# Patient Record
Sex: FEMALE | Race: WHITE | Employment: FULL TIME | URBAN - METROPOLITAN AREA
[De-identification: names, ages, dates, MRNs, and addresses within clinical notes are randomized per-mention and may not be internally consistent; named-entity substitution may affect disease eponyms.]

---

## 2017-01-05 LAB — TSH SERPL DL<=0.05 MIU/L-ACNC: 0.86 UIU/ML (ref 0.27–4.2)

## 2017-05-11 ENCOUNTER — TELEPHONE (OUTPATIENT)
Dept: FAMILY MEDICINE CLINIC | Age: 27
End: 2017-05-11

## 2017-05-11 ENCOUNTER — EMPLOYEE WELLNESS (OUTPATIENT)
Dept: OTHER | Age: 27
End: 2017-05-11

## 2017-05-11 DIAGNOSIS — K21.9 GASTROESOPHAGEAL REFLUX DISEASE, ESOPHAGITIS PRESENCE NOT SPECIFIED: Primary | ICD-10-CM

## 2017-05-11 LAB
CHOLESTEROL, TOTAL: 163 MG/DL (ref 0–199)
GLUCOSE BLD-MCNC: 72 MG/DL (ref 70–99)
HDLC SERPL-MCNC: 61 MG/DL (ref 40–60)
LDL CHOLESTEROL CALCULATED: 79 MG/DL
TRIGL SERPL-MCNC: 114 MG/DL (ref 0–150)

## 2017-05-12 RX ORDER — OMEPRAZOLE 20 MG/1
20 CAPSULE, DELAYED RELEASE ORAL DAILY
Qty: 30 CAPSULE | Refills: 0 | Status: SHIPPED | OUTPATIENT
Start: 2017-05-12 | End: 2017-11-27 | Stop reason: SDUPTHER

## 2017-11-27 ENCOUNTER — OFFICE VISIT (OUTPATIENT)
Dept: INTERNAL MEDICINE CLINIC | Age: 27
End: 2017-11-27

## 2017-11-27 VITALS
HEIGHT: 62 IN | HEART RATE: 90 BPM | DIASTOLIC BLOOD PRESSURE: 68 MMHG | WEIGHT: 211.8 LBS | SYSTOLIC BLOOD PRESSURE: 112 MMHG | OXYGEN SATURATION: 98 % | BODY MASS INDEX: 38.98 KG/M2 | TEMPERATURE: 97.9 F

## 2017-11-27 DIAGNOSIS — Z00.00 PHYSICAL EXAM, ANNUAL: Primary | ICD-10-CM

## 2017-11-27 DIAGNOSIS — E66.09 CLASS 2 OBESITY DUE TO EXCESS CALORIES WITHOUT SERIOUS COMORBIDITY WITH BODY MASS INDEX (BMI) OF 38.0 TO 38.9 IN ADULT: ICD-10-CM

## 2017-11-27 DIAGNOSIS — K21.9 GASTROESOPHAGEAL REFLUX DISEASE, ESOPHAGITIS PRESENCE NOT SPECIFIED: ICD-10-CM

## 2017-11-27 PROCEDURE — 99395 PREV VISIT EST AGE 18-39: CPT | Performed by: INTERNAL MEDICINE

## 2017-11-27 RX ORDER — OMEPRAZOLE 20 MG/1
20 CAPSULE, DELAYED RELEASE ORAL DAILY
Qty: 30 CAPSULE | Refills: 0 | Status: SHIPPED | OUTPATIENT
Start: 2017-11-27 | End: 2017-11-28 | Stop reason: SDUPTHER

## 2017-11-27 ASSESSMENT — PATIENT HEALTH QUESTIONNAIRE - PHQ9
1. LITTLE INTEREST OR PLEASURE IN DOING THINGS: 0
SUM OF ALL RESPONSES TO PHQ QUESTIONS 1-9: 0
2. FEELING DOWN, DEPRESSED OR HOPELESS: 0
SUM OF ALL RESPONSES TO PHQ9 QUESTIONS 1 & 2: 0

## 2017-11-27 NOTE — PATIENT INSTRUCTIONS
Please call your pharmacy if you need any refills of your medication(s). Please call our office at [phone] if you don't hear from us about your test results. Bring an accurate list of your medications with you at every appointment to ensure that we have the correct information.     Our office hours are: Monday - Friday 8:30 am- 5 pm    Phone lines turn on at 8:30 am

## 2017-11-27 NOTE — PROGRESS NOTES
SUBJECTIVE:  Natalie Hill is a 23 y.o. female being evaluated for:    Chief Complaint   Patient presents with   Evaristo Andres Doctor     no concerns       HPI   Comes in to  Establish and has no complaints or concerns     No Known Allergies  Current Outpatient Prescriptions   Medication Sig Dispense Refill    omeprazole (PRILOSEC) 20 MG delayed release capsule Take 1 capsule by mouth Daily 30 capsule 0     No current facility-administered medications for this visit. Social History     Social History    Marital status: Single     Spouse name: N/A    Number of children: N/A    Years of education: N/A     Occupational History    Not on file. Social History Main Topics    Smoking status: Never Smoker    Smokeless tobacco: Never Used    Alcohol use 0.0 oz/week      Comment: social use    Drug use: No    Sexual activity: Not on file     Other Topics Concern    Not on file     Social History Narrative    No narrative on file      Past Medical History:   Diagnosis Date    GERD (gastroesophageal reflux disease)      Past Surgical History:   Procedure Laterality Date    ANKLE SURGERY Left     WISDOM TOOTH EXTRACTION         Review of Systems   Constitutional: Negative for activity change, fatigue and unexpected weight change. HENT: Negative for congestion. Eyes: Negative for visual disturbance. Respiratory: Negative for cough and shortness of breath. Cardiovascular: Negative for chest pain, palpitations and leg swelling. Gastrointestinal: Negative for abdominal pain, constipation, diarrhea, nausea and vomiting. Reflux controlled with prilosec    Genitourinary: Negative for dysuria and frequency. Musculoskeletal: Positive for arthralgias (left ankle pain from remote soccer injury ). Skin:        NO Skin lesions that she is concerned about   Neurological: Negative for dizziness, light-headedness and headaches. Psychiatric/Behavioral: Negative for dysphoric mood. OBJECTIVE:  /68 (Site: Left Arm, Position: Sitting, Cuff Size: Large Adult)   Pulse 90   Temp 97.9 °F (36.6 °C) (Oral)   Ht 5' 2\" (1.575 m)   Wt 211 lb 12.8 oz (96.1 kg)   LMP 11/22/2017   SpO2 98%   BMI 38.74 kg/m²      Body mass index is 38.74 kg/m². Physical Exam   Constitutional: She is oriented to person, place, and time. She appears well-developed. No distress. Overweight    HENT:   Head: Normocephalic and atraumatic. Right Ear: External ear normal.   Left Ear: External ear normal.   Eyes: Conjunctivae are normal.   Neck: Neck supple. No thyromegaly present. Cardiovascular: Normal rate, regular rhythm, normal heart sounds and intact distal pulses. Exam reveals no gallop and no friction rub. No murmur heard. Pulmonary/Chest: Effort normal and breath sounds normal. She exhibits no tenderness. Abdominal: Soft. She exhibits no distension. There is no tenderness. No HSM   Musculoskeletal: Normal range of motion. She exhibits no edema or tenderness (No significant ankle tenderness and normal range of motion). Lymphadenopathy:     She has no cervical adenopathy. Neurological: She is alert and oriented to person, place, and time. Coordination normal.   Skin: Skin is warm and dry. No rash noted. Psychiatric: She has a normal mood and affect. Her behavior is normal.   Nursing note and vitals reviewed. ASSESSMENT/PLAN:    [facility] was seen today for established new doctor. Diagnoses and all orders for this visit:    Physical exam, annual    Gastroesophageal reflux disease, esophagitis presence not specified  -     omeprazole (PRILOSEC) 20 MG delayed release capsule;  Take 1 capsule by mouth Daily    Class 2 obesity due to excess calories without serious comorbidity with body mass index (BMI) of 38.0 to 38.9 in adult given information on dieting and low-carb diets        Orders Placed This Encounter   Medications    omeprazole (PRILOSEC) 20 MG delayed release capsule Sig: Take 1 capsule by mouth Daily     Dispense:  30 capsule     Refill:  0        Return in about 1 year (around 11/27/2018), or if symptoms worsen or fail to improve. Patient Instructions   Please call your pharmacy if you need any refills of your medication(s). Please call our office at [phone] if you don't hear from us about your test results. Bring an accurate list of your medications with you at every appointment to ensure that we have the correct information.     Our office hours are: Monday - Friday 8:30 am- 5 pm    Phone lines turn on at 8:30 am

## 2017-11-28 ENCOUNTER — TELEPHONE (OUTPATIENT)
Dept: INTERNAL MEDICINE CLINIC | Age: 27
End: 2017-11-28

## 2017-11-28 DIAGNOSIS — K21.9 GASTROESOPHAGEAL REFLUX DISEASE, ESOPHAGITIS PRESENCE NOT SPECIFIED: ICD-10-CM

## 2017-11-28 PROBLEM — E66.9 CLASS 2 OBESITY IN ADULT: Status: ACTIVE | Noted: 2017-11-28

## 2017-11-28 RX ORDER — OMEPRAZOLE 20 MG/1
20 CAPSULE, DELAYED RELEASE ORAL DAILY
Qty: 90 CAPSULE | Refills: 1 | Status: SHIPPED | OUTPATIENT
Start: 2017-11-28 | End: 2019-02-21 | Stop reason: ALTCHOICE

## 2017-11-28 ASSESSMENT — ENCOUNTER SYMPTOMS
SHORTNESS OF BREATH: 0
COUGH: 0
VOMITING: 0
ROS SKIN COMMENTS: NO SKIN LESIONS THAT SHE IS CONCERNED ABOUT
NAUSEA: 0
DIARRHEA: 0
ABDOMINAL PAIN: 0
CONSTIPATION: 0

## 2017-12-12 ENCOUNTER — OFFICE VISIT (OUTPATIENT)
Dept: INTERNAL MEDICINE CLINIC | Age: 27
End: 2017-12-12

## 2017-12-12 VITALS
BODY MASS INDEX: 39.38 KG/M2 | SYSTOLIC BLOOD PRESSURE: 110 MMHG | TEMPERATURE: 98.1 F | OXYGEN SATURATION: 98 % | DIASTOLIC BLOOD PRESSURE: 70 MMHG | HEIGHT: 62 IN | WEIGHT: 214 LBS | HEART RATE: 80 BPM

## 2017-12-12 DIAGNOSIS — J01.10 ACUTE NON-RECURRENT FRONTAL SINUSITIS: Primary | ICD-10-CM

## 2017-12-12 PROCEDURE — 99213 OFFICE O/P EST LOW 20 MIN: CPT | Performed by: INTERNAL MEDICINE

## 2017-12-12 RX ORDER — AMOXICILLIN 875 MG/1
875 TABLET, COATED ORAL 2 TIMES DAILY
Qty: 20 TABLET | Refills: 0 | Status: SHIPPED | OUTPATIENT
Start: 2017-12-12 | End: 2018-04-02

## 2017-12-12 NOTE — PROGRESS NOTES
SUBJECTIVE:  Jose Daniel Carl is a 33 y.o. female being evaluated for:    Chief Complaint   Patient presents with    Sinusitis     swollen facial pain, sore throat and cough getting worse past 2 days    Congestion       HPI   Sick on and off for a week and worse over the last 2 days. Head congestion and post nasal drip  Nasal drainage is clear  Coughing up clear. NO wheezing No SOB. No chest  Feverish but had no thermometer. Headache frontally. Very sore throat. Ear pressure bilaterally. Everyone is sick around her. No Known Allergies  Current Outpatient Prescriptions   Medication Sig Dispense Refill    amoxicillin (AMOXIL) 875 MG tablet Take 1 tablet by mouth 2 times daily 20 tablet 0    omeprazole (PRILOSEC) 20 MG delayed release capsule Take 1 capsule by mouth Daily 90 capsule 1     No current facility-administered medications for this visit. Social History     Social History    Marital status: Single     Spouse name: N/A    Number of children: N/A    Years of education: N/A     Occupational History    Not on file. Social History Main Topics    Smoking status: Never Smoker    Smokeless tobacco: Never Used    Alcohol use 0.0 oz/week      Comment: social use    Drug use: No    Sexual activity: Not on file     Other Topics Concern    Not on file     Social History Narrative    No narrative on file      Past Medical History:   Diagnosis Date    GERD (gastroesophageal reflux disease)      Past Surgical History:   Procedure Laterality Date    ANKLE SURGERY Left     WISDOM TOOTH EXTRACTION         Review of Systems   Constitutional: Positive for fever. Negative for chills. HENT: Positive for congestion, ear pain, postnasal drip, rhinorrhea and sore throat. Respiratory: Positive for cough. Negative for shortness of breath and wheezing. Gastrointestinal: Negative for abdominal pain, diarrhea, nausea and vomiting.    Genitourinary:        Not pregnant    Musculoskeletal: Negative for myalgias. Neurological: Positive for headaches (frontal ). Negative for dizziness and light-headedness. OBJECTIVE:  /70 (Site: Right Arm, Position: Sitting, Cuff Size: Large Adult)   Pulse 80   Temp 98.1 °F (36.7 °C) (Oral)   Ht 5' 2\" (1.575 m)   Wt 214 lb (97.1 kg)   LMP 11/22/2017   SpO2 98%   BMI 39.14 kg/m²      Body mass index is 39.14 kg/m². Physical Exam   Constitutional: She is oriented to person, place, and time. She appears well-developed. No distress. Obese   HENT:   Right Ear: External ear normal.   Left Ear: External ear normal.   Mouth/Throat: Oropharynx is clear and moist. No oropharyngeal exudate. Frontal tenderness   Eyes: Conjunctivae are normal.   Cardiovascular: Normal rate and regular rhythm. Exam reveals friction rub. Exam reveals no gallop. No murmur heard. Pulmonary/Chest: Effort normal and breath sounds normal.   Abdominal: Soft. She exhibits no distension. There is no tenderness. No hepatosplenomegaly   Musculoskeletal: She exhibits no edema. Lymphadenopathy:     She has cervical adenopathy (Anterior cervical nodes small). Neurological: She is alert and oriented to person, place, and time. Coordination normal.   Skin: Skin is warm and dry. No rash noted. ASSESSMENT/PLAN:    [facility]. Diagnoses and all orders for this visit:    Acute non-recurrent frontal sinusitis  -     amoxicillin (AMOXIL) 875 MG tablet; Take 1 tablet by mouth 2 times daily        Orders Placed This Encounter   Medications    amoxicillin (AMOXIL) 875 MG tablet     Sig: Take 1 tablet by mouth 2 times daily     Dispense:  20 tablet     Refill:  0        Return if symptoms worsen or fail to improve. Patient Instructions   Please call your pharmacy if you need any refills of your medication(s). Please call our office at [phone] if you don't hear from us about your test results.

## 2017-12-12 NOTE — PATIENT INSTRUCTIONS
Please call your pharmacy if you need any refills of your medication(s). Please call our office at [phone] if you don't hear from us about your test results. Bring an accurate list of your medications with you at every appointment to ensure that we have the correct information.     Our office hours are: Monday - Friday 8:30 am- 5 pm    Phone lines turn on at 8:30 am

## 2017-12-13 ASSESSMENT — ENCOUNTER SYMPTOMS
RHINORRHEA: 1
SHORTNESS OF BREATH: 0
DIARRHEA: 0
NAUSEA: 0
WHEEZING: 0
VOMITING: 0
COUGH: 1
SORE THROAT: 1
ABDOMINAL PAIN: 0

## 2018-03-06 ENCOUNTER — EMPLOYEE WELLNESS (OUTPATIENT)
Dept: OTHER | Age: 28
End: 2018-03-06

## 2018-03-06 LAB
CHOLESTEROL, TOTAL: 161 MG/DL (ref 0–199)
GLUCOSE BLD-MCNC: 84 MG/DL (ref 70–99)
HDLC SERPL-MCNC: 60 MG/DL (ref 40–60)
LDL CHOLESTEROL CALCULATED: 78 MG/DL
TRIGL SERPL-MCNC: 114 MG/DL (ref 0–150)

## 2018-03-20 VITALS — WEIGHT: 212 LBS | BODY MASS INDEX: 38.78 KG/M2

## 2018-04-02 ENCOUNTER — HOSPITAL ENCOUNTER (OUTPATIENT)
Dept: NON INVASIVE DIAGNOSTICS | Age: 28
Discharge: HOME | End: 2018-04-02
Attending: INTERNAL MEDICINE | Admitting: INTERNAL MEDICINE

## 2018-04-02 ENCOUNTER — OFFICE VISIT (OUTPATIENT)
Dept: INTERNAL MEDICINE CLINIC | Age: 28
End: 2018-04-02

## 2018-04-02 VITALS
DIASTOLIC BLOOD PRESSURE: 70 MMHG | HEIGHT: 62 IN | HEART RATE: 79 BPM | BODY MASS INDEX: 37.58 KG/M2 | OXYGEN SATURATION: 98 % | WEIGHT: 204.2 LBS | SYSTOLIC BLOOD PRESSURE: 100 MMHG

## 2018-04-02 VITALS — WEIGHT: 204 LBS | BODY MASS INDEX: 37.31 KG/M2

## 2018-04-02 DIAGNOSIS — S99.922A INJURY OF LEFT FOOT, INITIAL ENCOUNTER: ICD-10-CM

## 2018-04-02 DIAGNOSIS — K21.9 GASTROESOPHAGEAL REFLUX DISEASE, ESOPHAGITIS PRESENCE NOT SPECIFIED: ICD-10-CM

## 2018-04-02 DIAGNOSIS — S99.922A INJURY OF LEFT FOOT, INITIAL ENCOUNTER: Primary | ICD-10-CM

## 2018-04-02 PROCEDURE — 99212 OFFICE O/P EST SF 10 MIN: CPT | Performed by: INTERNAL MEDICINE

## 2018-04-02 RX ORDER — IBUPROFEN 800 MG/1
800 TABLET ORAL EVERY 8 HOURS PRN
Qty: 30 TABLET | Refills: 1 | Status: SHIPPED | OUTPATIENT
Start: 2018-04-02 | End: 2018-04-02 | Stop reason: CLARIF

## 2018-04-02 RX ORDER — IBUPROFEN 800 MG/1
800 TABLET ORAL EVERY 8 HOURS PRN
Qty: 30 TABLET | Refills: 1 | Status: SHIPPED
Start: 2018-04-02 | End: 2020-02-10 | Stop reason: ALTCHOICE

## 2018-04-07 ASSESSMENT — ENCOUNTER SYMPTOMS
VOMITING: 0
ABDOMINAL PAIN: 0
DIARRHEA: 0
NAUSEA: 0
BLOOD IN STOOL: 0
COLOR CHANGE: 1

## 2018-08-06 ENCOUNTER — OFFICE VISIT (OUTPATIENT)
Dept: INTERNAL MEDICINE CLINIC | Age: 28
End: 2018-08-06

## 2018-08-06 VITALS
DIASTOLIC BLOOD PRESSURE: 72 MMHG | HEART RATE: 84 BPM | SYSTOLIC BLOOD PRESSURE: 118 MMHG | OXYGEN SATURATION: 98 % | WEIGHT: 203 LBS | BODY MASS INDEX: 37.13 KG/M2 | TEMPERATURE: 98.5 F

## 2018-08-06 DIAGNOSIS — J06.9 ACUTE URI: Primary | ICD-10-CM

## 2018-08-06 PROCEDURE — 99213 OFFICE O/P EST LOW 20 MIN: CPT | Performed by: INTERNAL MEDICINE

## 2018-08-06 RX ORDER — AZITHROMYCIN 250 MG/1
TABLET, FILM COATED ORAL
Qty: 6 TABLET | Refills: 0 | Status: SHIPPED | OUTPATIENT
Start: 2018-08-06 | End: 2018-08-16

## 2018-08-06 RX ORDER — GUAIFENESIN AND CODEINE PHOSPHATE 100; 10 MG/5ML; MG/5ML
10 SOLUTION ORAL 3 TIMES DAILY PRN
Qty: 120 ML | Refills: 0 | Status: SHIPPED | OUTPATIENT
Start: 2018-08-06 | End: 2018-08-13

## 2018-08-06 ASSESSMENT — ENCOUNTER SYMPTOMS
GASTROINTESTINAL NEGATIVE: 1
SINUS PRESSURE: 1
COUGH: 1
SORE THROAT: 1

## 2019-01-24 ENCOUNTER — OFFICE VISIT (OUTPATIENT)
Dept: INTERNAL MEDICINE CLINIC | Age: 29
End: 2019-01-24
Payer: COMMERCIAL

## 2019-01-24 VITALS
DIASTOLIC BLOOD PRESSURE: 58 MMHG | RESPIRATION RATE: 16 BRPM | BODY MASS INDEX: 39.21 KG/M2 | WEIGHT: 214.4 LBS | TEMPERATURE: 98.3 F | HEART RATE: 78 BPM | SYSTOLIC BLOOD PRESSURE: 102 MMHG | OXYGEN SATURATION: 99 %

## 2019-01-24 DIAGNOSIS — E66.09 CLASS 2 OBESITY DUE TO EXCESS CALORIES WITHOUT SERIOUS COMORBIDITY WITH BODY MASS INDEX (BMI) OF 39.0 TO 39.9 IN ADULT: Primary | ICD-10-CM

## 2019-01-24 DIAGNOSIS — K21.9 GASTROESOPHAGEAL REFLUX DISEASE, ESOPHAGITIS PRESENCE NOT SPECIFIED: ICD-10-CM

## 2019-01-24 PROCEDURE — 99213 OFFICE O/P EST LOW 20 MIN: CPT | Performed by: INTERNAL MEDICINE

## 2019-01-24 RX ORDER — PHENTERMINE HYDROCHLORIDE 37.5 MG/1
37.5 TABLET ORAL
Qty: 30 TABLET | Refills: 0 | Status: SHIPPED | OUTPATIENT
Start: 2019-01-24 | End: 2019-02-21 | Stop reason: SDUPTHER

## 2019-01-24 RX ORDER — RANITIDINE 300 MG/1
300 TABLET ORAL NIGHTLY
Qty: 30 TABLET | Refills: 3 | Status: SHIPPED | OUTPATIENT
Start: 2019-01-24 | End: 2019-08-05 | Stop reason: SDUPTHER

## 2019-01-30 ASSESSMENT — ENCOUNTER SYMPTOMS
CONSTIPATION: 0
SHORTNESS OF BREATH: 0
VOMITING: 0
DIARRHEA: 0
ABDOMINAL PAIN: 0
COUGH: 0
NAUSEA: 0

## 2019-02-21 ENCOUNTER — OFFICE VISIT (OUTPATIENT)
Dept: INTERNAL MEDICINE CLINIC | Age: 29
End: 2019-02-21
Payer: COMMERCIAL

## 2019-02-21 VITALS
HEIGHT: 62 IN | BODY MASS INDEX: 37.36 KG/M2 | DIASTOLIC BLOOD PRESSURE: 70 MMHG | WEIGHT: 203 LBS | SYSTOLIC BLOOD PRESSURE: 110 MMHG | TEMPERATURE: 98.2 F

## 2019-02-21 DIAGNOSIS — E66.09 CLASS 2 OBESITY DUE TO EXCESS CALORIES WITHOUT SERIOUS COMORBIDITY WITH BODY MASS INDEX (BMI) OF 39.0 TO 39.9 IN ADULT: ICD-10-CM

## 2019-02-21 PROCEDURE — 99212 OFFICE O/P EST SF 10 MIN: CPT | Performed by: INTERNAL MEDICINE

## 2019-02-21 RX ORDER — PHENTERMINE HYDROCHLORIDE 37.5 MG/1
37.5 TABLET ORAL
Qty: 30 TABLET | Refills: 0 | Status: SHIPPED | OUTPATIENT
Start: 2019-02-21 | End: 2019-03-18 | Stop reason: SDUPTHER

## 2019-03-01 ASSESSMENT — ENCOUNTER SYMPTOMS
NAUSEA: 0
COUGH: 0
BLOOD IN STOOL: 0
ABDOMINAL PAIN: 0
DIARRHEA: 0
VOMITING: 0
SHORTNESS OF BREATH: 0

## 2019-03-18 ENCOUNTER — OFFICE VISIT (OUTPATIENT)
Dept: INTERNAL MEDICINE CLINIC | Age: 29
End: 2019-03-18
Payer: COMMERCIAL

## 2019-03-18 VITALS
SYSTOLIC BLOOD PRESSURE: 102 MMHG | BODY MASS INDEX: 36.58 KG/M2 | WEIGHT: 200 LBS | RESPIRATION RATE: 16 BRPM | HEART RATE: 99 BPM | TEMPERATURE: 98 F | OXYGEN SATURATION: 99 % | DIASTOLIC BLOOD PRESSURE: 62 MMHG

## 2019-03-18 DIAGNOSIS — E66.09 CLASS 2 OBESITY DUE TO EXCESS CALORIES WITHOUT SERIOUS COMORBIDITY WITH BODY MASS INDEX (BMI) OF 39.0 TO 39.9 IN ADULT: ICD-10-CM

## 2019-03-18 DIAGNOSIS — J31.0 RHINITIS, UNSPECIFIED TYPE: Primary | ICD-10-CM

## 2019-03-18 PROCEDURE — 99213 OFFICE O/P EST LOW 20 MIN: CPT | Performed by: INTERNAL MEDICINE

## 2019-03-18 RX ORDER — PHENTERMINE HYDROCHLORIDE 37.5 MG/1
37.5 TABLET ORAL
Qty: 30 TABLET | Refills: 0 | Status: SHIPPED | OUTPATIENT
Start: 2019-03-18 | End: 2019-04-17

## 2019-03-18 ASSESSMENT — PATIENT HEALTH QUESTIONNAIRE - PHQ9
SUM OF ALL RESPONSES TO PHQ QUESTIONS 1-9: 0
SUM OF ALL RESPONSES TO PHQ9 QUESTIONS 1 & 2: 0
SUM OF ALL RESPONSES TO PHQ QUESTIONS 1-9: 0
2. FEELING DOWN, DEPRESSED OR HOPELESS: 0
1. LITTLE INTEREST OR PLEASURE IN DOING THINGS: 0

## 2019-03-24 ASSESSMENT — ENCOUNTER SYMPTOMS
WHEEZING: 0
ABDOMINAL PAIN: 0
RHINORRHEA: 1
DIARRHEA: 0
VOMITING: 0
NAUSEA: 0
SORE THROAT: 0
COUGH: 1
SHORTNESS OF BREATH: 0

## 2019-04-07 ENCOUNTER — E-VISIT (OUTPATIENT)
Dept: INTERNAL MEDICINE | Age: 29
End: 2019-04-07
Payer: COMMERCIAL

## 2019-04-07 ENCOUNTER — TELEPHONE (OUTPATIENT)
Dept: INTERNAL MEDICINE CLINIC | Age: 29
End: 2019-04-07

## 2019-04-07 ENCOUNTER — NURSE TRIAGE (OUTPATIENT)
Dept: OTHER | Facility: CLINIC | Age: 29
End: 2019-04-07

## 2019-04-07 DIAGNOSIS — J06.9 ACUTE URI: Primary | ICD-10-CM

## 2019-04-07 PROCEDURE — 98969 PR NONPHYSICIAN ONLINE ASSESSMENT AND MANAGEMENT: CPT | Performed by: PHYSICIAN ASSISTANT

## 2019-04-07 RX ORDER — CETIRIZINE HYDROCHLORIDE 10 MG/1
10 TABLET ORAL DAILY
Qty: 30 TABLET | Refills: 0 | Status: SHIPPED | OUTPATIENT
Start: 2019-04-07 | End: 2022-03-01 | Stop reason: SDUPTHER

## 2019-04-07 RX ORDER — AMOXICILLIN 875 MG/1
875 TABLET, COATED ORAL 2 TIMES DAILY
Qty: 20 TABLET | Refills: 0 | Status: SHIPPED | OUTPATIENT
Start: 2019-04-07 | End: 2019-04-17

## 2019-04-07 ASSESSMENT — LIFESTYLE VARIABLES: SMOKING_STATUS: NO, I'VE NEVER SMOKED

## 2019-04-07 NOTE — TELEPHONE ENCOUNTER
Reason for Disposition   Continuous (nonstop) coughing interferes with work or school and no improvement using cough treatment per Care Advice    Protocols used: COUGH-ADULT-[place]    Caller has had symptoms of a URI for the past week along with sinus pain/pressure. Caller has now developed ear pain/pressure. Caller states she is going on vacation soon and would like to be seen. Recommended that caller complete an E-visit.

## 2019-04-07 NOTE — PROGRESS NOTES
HPI: per patient questionnaire  Exam: not applicable     Diagnoses and all orders for this visit:    Acute URI  -     cetirizine (ZYRTEC) 10 MG tablet; Take 1 tablet by mouth daily          The patient was advised f/u with their PCP  if these symptoms worsen or fail to improve as anticipated.      Electronically signed by ALBANIA Macias on 4/7/2019 at 9:57 AM

## 2019-04-08 NOTE — TELEPHONE ENCOUNTER
Call patient. I did call in an antibiotic over the weekend.  If she's not better she probably does require an appointment

## 2019-04-09 ENCOUNTER — OFFICE VISIT (OUTPATIENT)
Dept: INTERNAL MEDICINE CLINIC | Age: 29
End: 2019-04-09
Payer: COMMERCIAL

## 2019-04-09 VITALS
WEIGHT: 197.4 LBS | TEMPERATURE: 98 F | HEART RATE: 82 BPM | DIASTOLIC BLOOD PRESSURE: 66 MMHG | HEIGHT: 62 IN | BODY MASS INDEX: 36.33 KG/M2 | OXYGEN SATURATION: 99 % | SYSTOLIC BLOOD PRESSURE: 104 MMHG

## 2019-04-09 DIAGNOSIS — H66.003 ACUTE SUPPURATIVE OTITIS MEDIA OF BOTH EARS WITHOUT SPONTANEOUS RUPTURE OF TYMPANIC MEMBRANES, RECURRENCE NOT SPECIFIED: Primary | ICD-10-CM

## 2019-04-09 DIAGNOSIS — Z71.84 TRAVEL ADVICE ENCOUNTER: ICD-10-CM

## 2019-04-09 DIAGNOSIS — E66.09 CLASS 2 OBESITY DUE TO EXCESS CALORIES WITHOUT SERIOUS COMORBIDITY WITH BODY MASS INDEX (BMI) OF 38.0 TO 38.9 IN ADULT: ICD-10-CM

## 2019-04-09 PROCEDURE — 99213 OFFICE O/P EST LOW 20 MIN: CPT | Performed by: INTERNAL MEDICINE

## 2019-04-09 RX ORDER — PREDNISONE 20 MG/1
20 TABLET ORAL DAILY
Qty: 10 TABLET | Refills: 0 | Status: SHIPPED | OUTPATIENT
Start: 2019-04-09 | End: 2021-06-30 | Stop reason: SDUPTHER

## 2019-04-09 RX ORDER — LEVOFLOXACIN 500 MG/1
500 TABLET, FILM COATED ORAL DAILY
Qty: 10 TABLET | Refills: 0 | Status: SHIPPED | OUTPATIENT
Start: 2019-04-09 | End: 2019-04-19

## 2019-04-09 NOTE — PROGRESS NOTES
SUBJECTIVE:  Maritza Yoon is a 33 y.o. female being evaluated for:    Chief Complaint   Patient presents with    URI     productive cough(clear), nasal drainage(clear), pressure behind the eyes, headaches, sore throat intermitted, and b/l ear pain x 4 days. took claritin, mucinex, and benadryl. started amoxil 4/7/19. HPI   Sick for 4 days  Started Friday with a sore throat. Tried claritin and mucinex. Sunday super congested with cough  Ear pain bilaterallly Nasal drainage is clear  PND  Coughing clear sputum  No wheezing sob or chest pain. Headaches frontal.  NO dizziness or light headedness. No fevers or chills  Called over weekend and put on amoxil  No change with it [place]. No Known Allergies  Current Outpatient Medications   Medication Sig Dispense Refill    levofloxacin (LEVAQUIN) 500 MG tablet Take 1 tablet by mouth daily for 10 days 10 tablet 0    predniSONE (DELTASONE) 20 MG tablet Take 1 tablet by mouth daily for 10 days 10 tablet 0    naltrexone-bupropion (CONTRAVE) 8-90 MG per extended release tablet Take 2 tablets by mouth 2 times daily 60 tablet 5    cetirizine (ZYRTEC) 10 MG tablet Take 1 tablet by mouth daily 30 tablet 0    amoxicillin (AMOXIL) 875 MG tablet Take 1 tablet by mouth 2 times daily for 10 days 20 tablet 0    etonogestrel (NEXPLANON) 68 MG implant 68 mg by Subdermal route once      phentermine (ADIPEX-P) 37.5 MG tablet Take 1 tablet by mouth every morning (before breakfast) for 30 days. 30 tablet 0    ranitidine (ZANTAC) 300 MG tablet Take 1 tablet by mouth nightly 30 tablet 3    ibuprofen (IBU) 800 MG tablet Take 1 tablet by mouth every 8 hours as needed for Pain Take with food. 30 tablet 1     No current facility-administered medications for this visit.           Social History     Socioeconomic History    Marital status: Single     Spouse name: Not on file    Number of children: Not on file    Years of education: Not on file    Highest education level: Not on file   Occupational History    Not on file   Social Needs    Financial resource strain: Not on file    Food insecurity:     Worry: Not on file     Inability: Not on file    Transportation needs:     Medical: Not on file     Non-medical: Not on file   Tobacco Use    Smoking status: Never Smoker    Smokeless tobacco: Never Used   Substance and Sexual Activity    Alcohol use: Yes     Alcohol/week: 0.0 oz     Comment: social use    Drug use: No    Sexual activity: Not on file   Lifestyle    Physical activity:     Days per week: Not on file     Minutes per session: Not on file    Stress: Not on file   Relationships    Social connections:     Talks on phone: Not on file     Gets together: Not on file     Attends [employer] service: Not on file     Active member of club or organization: Not on file     Attends meetings of clubs or organizations: Not on file     Relationship status: Not on file    Intimate partner violence:     Fear of current or ex partner: Not on file     Emotionally abused: Not on file     Physically abused: Not on file     Forced sexual activity: Not on file   Other Topics Concern    Not on file   Social History Narrative    Not on file      Past Medical History:   Diagnosis Date    GERD (gastroesophageal reflux disease)      Past Surgical History:   Procedure Laterality Date    ANKLE SURGERY Left     WISDOM TOOTH EXTRACTION         Review of Systems   Constitutional: Negative for chills and fever. HENT: Positive for congestion, ear pain, postnasal drip, rhinorrhea and sore throat. Respiratory: Positive for cough. Negative for shortness of breath and wheezing. Cardiovascular: Negative for chest pain and palpitations. Gastrointestinal: Negative for abdominal pain, diarrhea, nausea and vomiting. Genitourinary: Negative for menstrual problem (not pregnant ). Musculoskeletal: Negative for myalgias and neck stiffness.    Neurological: Positive for headaches. Negative for dizziness and light-headedness. OBJECTIVE:  /66   Pulse 82   Temp 98 °F (36.7 °C) (Oral)   Ht 5' 2\" (1.575 m)   Wt 197 lb 6.4 oz (89.5 kg)   LMP 01/07/2019 (Within Days)   SpO2 99%   Breastfeeding? No   BMI 36.10 kg/m²      Body mass index is 36.1 kg/m². Physical Exam   Constitutional: She is oriented to person, place, and time. She appears well-developed. No distress. Overweight    HENT:   Head: Normocephalic and atraumatic. Right Ear: External ear normal.   Left Ear: External ear normal.   Mouth/Throat: Oropharynx is clear and moist.   Tympanic membranes bilaterally red with fluid  Max sinus tenderness     Eyes: Conjunctivae are normal.   Neck: Neck supple. No thyromegaly present. No meningismus    Cardiovascular: Normal rate, regular rhythm and normal heart sounds. Exam reveals no gallop and no friction rub. No murmur heard. Pulmonary/Chest: Effort normal and breath sounds normal. She exhibits no tenderness. Abdominal: Soft. She exhibits no distension. There is no tenderness. No HSM   Musculoskeletal: She exhibits no edema. Lymphadenopathy:     She has cervical adenopathy. Neurological: She is alert and oriented to person, place, and time. Coordination normal.   Skin: Skin is warm and dry. Psychiatric: She has a normal mood and affect. Her behavior is normal.   Nursing note and vitals reviewed. ASSESSMENT/PLAN:    [facility] was seen today for uri. Diagnoses and all orders for this visit:    Acute suppurative otitis media of both ears without spontaneous rupture of tympanic membranes, recurrence not specified  -     levofloxacin (LEVAQUIN) 500 MG tablet; Take 1 tablet by mouth daily for 10 days  -     predniSONE (DELTASONE) 20 MG tablet;  Take 1 tablet by mouth daily for 10 days    Class 2 obesity due to excess calories without serious comorbidity with body mass index (BMI) of 38.0 to 38.9 in adult  Wants to start on contrave and given script  Told bull on web site for copay cards       Orders Placed This Encounter   Medications    levofloxacin (LEVAQUIN) 500 MG tablet     Sig: Take 1 tablet by mouth daily for 10 days     Dispense:  10 tablet     Refill:  0    predniSONE (DELTASONE) 20 MG tablet     Sig: Take 1 tablet by mouth daily for 10 days     Dispense:  10 tablet     Refill:  0    naltrexone-bupropion (CONTRAVE) 8-90 MG per extended release tablet     Sig: Take 2 tablets by mouth 2 times daily     Dispense:  60 tablet     Refill:  5        Return if symptoms worsen or fail to improve. There are no Patient Instructions on file for this visit.

## 2019-04-14 ASSESSMENT — ENCOUNTER SYMPTOMS
SHORTNESS OF BREATH: 0
RHINORRHEA: 1
SORE THROAT: 1
VOMITING: 0
COUGH: 1
WHEEZING: 0
DIARRHEA: 0
ABDOMINAL PAIN: 0
NAUSEA: 0

## 2019-05-20 ENCOUNTER — EMPLOYEE WELLNESS (OUTPATIENT)
Dept: OTHER | Age: 29
End: 2019-05-20

## 2019-05-20 LAB
CHOLESTEROL, TOTAL: 154 MG/DL (ref 0–199)
GLUCOSE BLD-MCNC: 82 MG/DL (ref 70–99)
HDLC SERPL-MCNC: 52 MG/DL (ref 40–60)
LDL CHOLESTEROL CALCULATED: 88 MG/DL
TRIGL SERPL-MCNC: 68 MG/DL (ref 0–150)

## 2019-05-28 VITALS — BODY MASS INDEX: 36.21 KG/M2 | WEIGHT: 198 LBS

## 2019-08-05 DIAGNOSIS — K21.9 GASTROESOPHAGEAL REFLUX DISEASE, ESOPHAGITIS PRESENCE NOT SPECIFIED: ICD-10-CM

## 2019-08-05 RX ORDER — RANITIDINE 300 MG/1
300 TABLET ORAL NIGHTLY
Qty: 90 TABLET | Refills: 1 | Status: SHIPPED | OUTPATIENT
Start: 2019-08-05 | End: 2020-12-03 | Stop reason: SDUPTHER

## 2019-10-24 ENCOUNTER — OFFICE VISIT (OUTPATIENT)
Dept: INTERNAL MEDICINE CLINIC | Age: 29
End: 2019-10-24
Payer: COMMERCIAL

## 2019-10-24 VITALS
HEIGHT: 61 IN | SYSTOLIC BLOOD PRESSURE: 114 MMHG | WEIGHT: 204 LBS | HEART RATE: 82 BPM | BODY MASS INDEX: 38.51 KG/M2 | OXYGEN SATURATION: 98 % | DIASTOLIC BLOOD PRESSURE: 80 MMHG | TEMPERATURE: 98.1 F

## 2019-10-24 DIAGNOSIS — Z23 NEED FOR TDAP VACCINATION: ICD-10-CM

## 2019-10-24 DIAGNOSIS — E66.09 CLASS 2 OBESITY DUE TO EXCESS CALORIES WITHOUT SERIOUS COMORBIDITY WITH BODY MASS INDEX (BMI) OF 38.0 TO 38.9 IN ADULT: ICD-10-CM

## 2019-10-24 DIAGNOSIS — Z00.00 PHYSICAL EXAM, ANNUAL: Primary | ICD-10-CM

## 2019-10-24 PROCEDURE — 99395 PREV VISIT EST AGE 18-39: CPT | Performed by: INTERNAL MEDICINE

## 2019-10-24 PROCEDURE — 90471 IMMUNIZATION ADMIN: CPT | Performed by: INTERNAL MEDICINE

## 2019-10-24 PROCEDURE — 90715 TDAP VACCINE 7 YRS/> IM: CPT | Performed by: INTERNAL MEDICINE

## 2019-10-24 ASSESSMENT — ENCOUNTER SYMPTOMS
NAUSEA: 0
SHORTNESS OF BREATH: 0
DIARRHEA: 0
CONSTIPATION: 0
COUGH: 0
VOMITING: 0
BLOOD IN STOOL: 0
ROS SKIN COMMENTS: NO CONCERNING SKIN LESION
BACK PAIN: 0
SINUS PRESSURE: 0
ABDOMINAL PAIN: 0

## 2020-02-10 ENCOUNTER — OFFICE VISIT (OUTPATIENT)
Dept: INTERNAL MEDICINE CLINIC | Age: 30
End: 2020-02-10
Payer: COMMERCIAL

## 2020-02-10 VITALS
SYSTOLIC BLOOD PRESSURE: 110 MMHG | DIASTOLIC BLOOD PRESSURE: 68 MMHG | WEIGHT: 207.8 LBS | TEMPERATURE: 98.1 F | BODY MASS INDEX: 39.23 KG/M2 | OXYGEN SATURATION: 98 % | HEART RATE: 100 BPM | HEIGHT: 61 IN

## 2020-02-10 PROCEDURE — 99212 OFFICE O/P EST SF 10 MIN: CPT | Performed by: INTERNAL MEDICINE

## 2020-02-10 RX ORDER — IBUPROFEN 200 MG
200 TABLET ORAL EVERY 6 HOURS PRN
COMMUNITY

## 2020-02-10 RX ORDER — PHENTERMINE HYDROCHLORIDE 37.5 MG/1
37.5 TABLET ORAL
Qty: 30 TABLET | Refills: 0 | Status: SHIPPED | OUTPATIENT
Start: 2020-02-10 | End: 2020-03-12 | Stop reason: SDUPTHER

## 2020-02-10 ASSESSMENT — PATIENT HEALTH QUESTIONNAIRE - PHQ9
SUM OF ALL RESPONSES TO PHQ9 QUESTIONS 1 & 2: 0
SUM OF ALL RESPONSES TO PHQ QUESTIONS 1-9: 0
SUM OF ALL RESPONSES TO PHQ QUESTIONS 1-9: 0
2. FEELING DOWN, DEPRESSED OR HOPELESS: 0
1. LITTLE INTEREST OR PLEASURE IN DOING THINGS: 0

## 2020-02-10 NOTE — PROGRESS NOTES
on file     Active member of club or organization: Not on file     Attends meetings of clubs or organizations: Not on file     Relationship status: Not on file    Intimate partner violence:     Fear of current or ex partner: Not on file     Emotionally abused: Not on file     Physically abused: Not on file     Forced sexual activity: Not on file   Other Topics Concern    Not on file   Social History Narrative    Not on file      Past Medical History:   Diagnosis Date    GERD (gastroesophageal reflux disease)     Obesity      Past Surgical History:   Procedure Laterality Date    ANKLE SURGERY Left     tendon injury and scar tissue removal     WISDOM TOOTH EXTRACTION         Review of Systems   Constitutional: Positive for unexpected weight change (up ). Respiratory: Negative for cough and shortness of breath. Cardiovascular: Negative for chest pain, palpitations and leg swelling. Gastrointestinal: Negative for abdominal pain, blood in stool, diarrhea, nausea and vomiting. Less heartburn and indigestion   Genitourinary: Negative for menstrual problem (Not pregnant). Neurological: Negative for dizziness, light-headedness and headaches. Psychiatric/Behavioral: Negative for agitation and sleep disturbance. OBJECTIVE:  /68   Pulse 100   Temp 98.1 °F (36.7 °C) (Oral)   Ht 5' 1\" (1.549 m)   Wt 207 lb 12.8 oz (94.3 kg)   LMP  (LMP Unknown)   SpO2 98%   Breastfeeding No   BMI 39.26 kg/m²      Body mass index is 39.26 kg/m². Physical Exam  Vitals signs and nursing note reviewed. Constitutional:       General: She is not in acute distress. Appearance: Normal appearance. She is well-developed. She is obese. Comments: Overweight    HENT:      Head: Normocephalic and atraumatic. Eyes:      Conjunctiva/sclera: Conjunctivae normal.   Neck:      Musculoskeletal: Neck supple. Thyroid: No thyromegaly.    Cardiovascular:      Rate and Rhythm: Normal rate and regular rhythm. Heart sounds: Normal heart sounds. No murmur. No friction rub. No gallop. Pulmonary:      Effort: Pulmonary effort is normal.      Breath sounds: Normal breath sounds. Chest:      Chest wall: No tenderness. Abdominal:      General: There is no distension. Palpations: Abdomen is soft. Tenderness: There is no abdominal tenderness. Comments: No HSM   Musculoskeletal:         General: No swelling. Lymphadenopathy:      Cervical: No cervical adenopathy. Skin:     General: Skin is warm and dry. Neurological:      General: No focal deficit present. Mental Status: She is alert and oriented to person, place, and time. Gait: Gait normal.   Psychiatric:         Behavior: Behavior normal.         Thought Content: Thought content normal.         ASSESSMENT/PLAN:    [facility] was seen today for weight management. Diagnoses and all orders for this visit:    Class 2 obesity due to excess calories without serious comorbidity with body mass index (BMI) of 39.0 to 39.9 in adult  oarrs was reviewed  -     phentermine (ADIPEX-P) 37.5 MG tablet; Take 1 tablet by mouth every morning (before breakfast) for 30 days. BMI 39.25        Orders Placed This Encounter   Medications    phentermine (ADIPEX-P) 37.5 MG tablet     Sig: Take 1 tablet by mouth every morning (before breakfast) for 30 days. BMI 39.25     Dispense:  30 tablet     Refill:  0        Return in about 4 weeks (around 3/9/2020), or if symptoms worsen or fail to improve. Patient Instructions     Patient Education        Starting a Weight Loss Plan: Care Instructions  Your Care Instructions    If you are thinking about losing weight, it can be hard to know where to start. Your doctor can help you set up a weight loss plan that best meets your needs. You may want to take a class on nutrition or exercise, or join a weight loss support group.  If you have questions about how to make changes to your eating or exercise habits, ask a certain number of calories each day. After your body uses the calories it needs, it stores extra calories as fat. To lose weight safely, you have to eat fewer calories while eating in a healthy way. How many calories do you need each day? The more active you are, the more calories you need. When you are less active, you need fewer calories. How many calories you need each day also depends on several things, including your age and whether you are male or female. Here are some general guidelines for adults:  · Less active women and older adults need 1,600 to 2,000 calories each day. · Active women and less active men need 2,000 to 2,400 calories each day. · Active men need 2,400 to 3,000 calories each day. How can you cut calories and eat healthy meals? Whole grains, vegetables and fruits, and dried beans are good lower-calorie foods. They give you lots of nutrients and fiber. And they fill you up. Sweets, energy drinks, and soda pop are high in calories. They give you few nutrients and no fiber. Try to limit soda pop, fruit juice, and energy drinks. Drink water instead. Some fats can be part of a healthy diet. But cutting back on fats from highly processed foods like fast foods and many snack foods is a good way to lower the calories in your diet. Also, use smaller amounts of fats like butter, margarine, salad dressing, and mayonnaise. Add fresh garlic, lemon, or herbs to your meals to add flavor without adding fat. Meats and dairy products can be a big source of hidden fats. Try to choose lean or low-fat versions of these products. Fat-free cookies, candies, chips, and frozen treats can still be high in sugar and calories. Some fat-free foods have more calories than regular ones. Eat fat-free treats in moderation, as you would other foods. If your favorite foods are high in fat, salt, sugar, or calories, limit how often you eat them. Eat smaller servings, or look for healthy substitutes.  Fill up on

## 2020-02-10 NOTE — PATIENT INSTRUCTIONS
loss goals. ? A dietitian can help you make healthy changes in your diet. ? An exercise specialist or  can help you develop a safe and effective exercise program.  ? A counselor or psychiatrist can help you cope with issues such as depression, anxiety, or family problems that can make it hard to focus on weight loss. · Consider joining a support group for people who are trying to lose weight. Your doctor can suggest groups in your area. Where can you learn more? Go to https://[email or web address].[email or web address]. org and sign in to your [employer] account. Enter I833 in the .com box to learn more about \"Starting a Weight Loss Plan: Care Instructions. \"     If you do not have an account, please click on the \"Sign Up Now\" link. Current as of: March 28, 2019  Content Version: 12.3  © 5079-6214 [employer]. Care instructions adapted under license by [facility] ([facility]). If you have questions about a medical condition or this instruction, always ask your healthcare professional. [address] any warranty or liability for your use of this information. Patient Education        Learning About Low-Carbohydrate Diets for Weight Loss  What is a low-carbohydrate diet? Low-carb diets avoid foods that are high in carbohydrate. These high-carb foods include pasta, bread, rice, cereal, fruits, and starchy vegetables. Instead, these diets usually have you eat foods that are high in fat and protein. Many people lose weight quickly on a low-carb diet. But the early weight loss is water. People on this diet often gain the weight back after they start eating carbs again. Not all diet plans are safe or work well. A lot of the evidence shows that low-carb diets aren't healthy. That's because these diets often don't include healthy foods like fruits and vegetables. Losing weight safely means balancing protein, fat, and carbs with every meal and snack.  And low-carb diets Version: 12.3  © 3743-1168 [employer]. Care instructions adapted under license by [facility] ([facility]). If you have questions about a medical condition or this instruction, always ask your healthcare professional. [address] any warranty or liability for your use of this information.

## 2020-02-10 NOTE — PROGRESS NOTES
Subjective:      Patient ID: Will Seen is a 30 y.o. female.     HPI    Review of Systems    Objective:   Physical Exam    Assessment:      Error       Plan:      Error         Sandra Moreno MD

## 2020-02-23 ASSESSMENT — ENCOUNTER SYMPTOMS
COUGH: 0
BLOOD IN STOOL: 0
NAUSEA: 0
DIARRHEA: 0
ABDOMINAL PAIN: 0
SHORTNESS OF BREATH: 0
VOMITING: 0

## 2020-03-12 ENCOUNTER — OFFICE VISIT (OUTPATIENT)
Dept: INTERNAL MEDICINE CLINIC | Age: 30
End: 2020-03-12
Payer: COMMERCIAL

## 2020-03-12 VITALS
OXYGEN SATURATION: 98 % | HEART RATE: 82 BPM | BODY MASS INDEX: 38.18 KG/M2 | TEMPERATURE: 98.5 F | SYSTOLIC BLOOD PRESSURE: 112 MMHG | HEIGHT: 61 IN | DIASTOLIC BLOOD PRESSURE: 74 MMHG | WEIGHT: 202.2 LBS

## 2020-03-12 PROCEDURE — 99212 OFFICE O/P EST SF 10 MIN: CPT | Performed by: INTERNAL MEDICINE

## 2020-03-12 RX ORDER — PHENTERMINE HYDROCHLORIDE 37.5 MG/1
37.5 TABLET ORAL
Qty: 30 TABLET | Refills: 0 | Status: SHIPPED | OUTPATIENT
Start: 2020-03-12 | End: 2020-04-11

## 2020-03-12 NOTE — PROGRESS NOTES
sounds: Normal heart sounds. No murmur. No friction rub. No gallop. Pulmonary:      Effort: Pulmonary effort is normal.      Breath sounds: Normal breath sounds. Chest:      Chest wall: No tenderness. Abdominal:      General: There is no distension. Palpations: Abdomen is soft. Tenderness: There is no abdominal tenderness. Comments: No HSM   Musculoskeletal:         General: No swelling. Lymphadenopathy:      Cervical: No cervical adenopathy. Skin:     General: Skin is warm and dry. Neurological:      General: No focal deficit present. Mental Status: She is alert. Gait: Gait normal.   Psychiatric:         Behavior: Behavior normal.         Thought Content: Thought content normal.         ASSESSMENT/PLAN:    [facility] was seen today for weight management. Diagnoses and all orders for this visit:    Class 2 obesity due to excess calories without serious comorbidity with body mass index (BMI) of 39.0 to 39.9 in adult  Reviewed oarrs   -     phentermine (ADIPEX-P) 37.5 MG tablet; Take 1 tablet by mouth every morning (before breakfast) for 30 days. BMI 39.25        Orders Placed This Encounter   Medications    phentermine (ADIPEX-P) 37.5 MG tablet     Sig: Take 1 tablet by mouth every morning (before breakfast) for 30 days. BMI 39.25     Dispense:  30 tablet     Refill:  0        Return in about 4 weeks (around 4/9/2020). There are no Patient Instructions on file for this visit.

## 2020-04-05 ASSESSMENT — ENCOUNTER SYMPTOMS
ABDOMINAL PAIN: 0
NAUSEA: 0
DIARRHEA: 0
SHORTNESS OF BREATH: 0
COUGH: 0
VOMITING: 0

## 2020-07-22 ENCOUNTER — OFFICE VISIT (OUTPATIENT)
Dept: FAMILY MEDICINE CLINIC | Age: 30
End: 2020-07-22
Payer: COMMERCIAL

## 2020-07-22 VITALS
OXYGEN SATURATION: 98 % | RESPIRATION RATE: 16 BRPM | DIASTOLIC BLOOD PRESSURE: 64 MMHG | SYSTOLIC BLOOD PRESSURE: 92 MMHG | BODY MASS INDEX: 38.89 KG/M2 | HEART RATE: 80 BPM | WEIGHT: 205.8 LBS | TEMPERATURE: 97.3 F

## 2020-07-22 PROCEDURE — 99212 OFFICE O/P EST SF 10 MIN: CPT | Performed by: INTERNAL MEDICINE

## 2020-07-22 RX ORDER — PHENTERMINE HYDROCHLORIDE 37.5 MG/1
37.5 TABLET ORAL
Qty: 30 TABLET | Refills: 0 | Status: SHIPPED | OUTPATIENT
Start: 2020-07-22 | End: 2020-08-21

## 2020-08-01 ASSESSMENT — ENCOUNTER SYMPTOMS
ABDOMINAL PAIN: 0
NAUSEA: 0
VOMITING: 0
DIARRHEA: 0

## 2020-08-20 ENCOUNTER — OFFICE VISIT (OUTPATIENT)
Dept: FAMILY MEDICINE CLINIC | Age: 30
End: 2020-08-20
Payer: COMMERCIAL

## 2020-08-20 VITALS
DIASTOLIC BLOOD PRESSURE: 62 MMHG | SYSTOLIC BLOOD PRESSURE: 98 MMHG | BODY MASS INDEX: 38.74 KG/M2 | TEMPERATURE: 97.7 F | HEART RATE: 86 BPM | HEIGHT: 61 IN | WEIGHT: 205.2 LBS | OXYGEN SATURATION: 99 %

## 2020-08-20 PROCEDURE — 99212 OFFICE O/P EST SF 10 MIN: CPT | Performed by: INTERNAL MEDICINE

## 2020-08-20 NOTE — PROGRESS NOTES
SUBJECTIVE:  Tsering Hugo is a 30 y.o. female being evaluated for:    Chief Complaint   Patient presents with    Weight Management     1 mo f/u Adipex refill       HPI   Weight is not really going anywhere   Weight has not changed  Adipex is not working       No Known Allergies  Current Outpatient Medications   Medication Sig Dispense Refill    liraglutide-weight management 18 MG/3ML SOPN .6 mg sq daily for 1 week, 1.2 mg sq daily for 1 week , 1.8mg  sq daily for a week,  2.4 mg for a week then finally 3 mg daily to continue 4 pen 1    ibuprofen (ADVIL;MOTRIN) 200 MG tablet Take 200 mg by mouth every 6 hours as needed for Pain (ankle pain)      ranitidine (ZANTAC) 300 MG tablet Take 1 tablet by mouth nightly 90 tablet 1    cetirizine (ZYRTEC) 10 MG tablet Take 1 tablet by mouth daily 30 tablet 0    etonogestrel (NEXPLANON) 68 MG implant 68 mg by Subdermal route once       No current facility-administered medications for this visit. Social History     Socioeconomic History    Marital status: Single     Spouse name: Not on file    Number of children: Not on file    Years of education: Not on file    Highest education level: Not on file   Occupational History    Not on file   Social Needs    Financial resource strain: Not on file    Food insecurity     Worry: Not on file     Inability: Not on file    Transportation needs     Medical: Not on file     Non-medical: Not on file   Tobacco Use    Smoking status: Never Smoker    Smokeless tobacco: Never Used   Substance and Sexual Activity    Alcohol use:  Yes     Alcohol/week: 0.0 standard drinks     Comment: social use    Drug use: No    Sexual activity: Yes     Partners: Male   Lifestyle    Physical activity     Days per week: Not on file     Minutes per session: Not on file    Stress: Not on file   Relationships    Social connections     Talks on phone: Not on file     Gets together: Not on file     Attends Synagogue service: Not on file Active member of club or organization: Not on file     Attends meetings of clubs or organizations: Not on file     Relationship status: Not on file    Intimate partner violence     Fear of current or ex partner: Not on file     Emotionally abused: Not on file     Physically abused: Not on file     Forced sexual activity: Not on file   Other Topics Concern    Not on file   Social History Narrative    Not on file      Past Medical History:   Diagnosis Date    GERD (gastroesophageal reflux disease)     Obesity      Past Surgical History:   Procedure Laterality Date    ANKLE SURGERY Left     tendon injury and scar tissue removal     WISDOM TOOTH EXTRACTION         Review of Systems   Constitutional: Negative for activity change, fatigue and unexpected weight change. Respiratory: Negative for cough and shortness of breath. Cardiovascular: Negative for chest pain, palpitations and leg swelling. Gastrointestinal: Negative for abdominal pain, nausea and vomiting. Genitourinary: Negative for menstrual problem (not pregnant ). Neurological: Negative for dizziness, light-headedness and headaches. Psychiatric/Behavioral: Negative for agitation, behavioral problems, hallucinations and sleep disturbance. OBJECTIVE:  BP 98/62   Pulse 86   Temp 97.7 °F (36.5 °C) (Temporal)   Ht 5' 1\" (1.549 m)   Wt 205 lb 3.2 oz (93.1 kg)   SpO2 99%   Breastfeeding No   BMI 38.77 kg/m²      Body mass index is 38.77 kg/m². Physical Exam  Vitals signs and nursing note reviewed. Constitutional:       General: She is not in acute distress. Appearance: Normal appearance. She is well-developed. She is obese. HENT:      Head: Normocephalic and atraumatic. Eyes:      Conjunctiva/sclera: Conjunctivae normal.   Neck:      Musculoskeletal: Neck supple. Thyroid: No thyromegaly. Cardiovascular:      Rate and Rhythm: Normal rate and regular rhythm. Heart sounds: Normal heart sounds. No murmur.  No friction rub. No gallop. Pulmonary:      Effort: Pulmonary effort is normal.      Breath sounds: Normal breath sounds. Chest:      Chest wall: No tenderness. Abdominal:      General: There is no distension. Palpations: Abdomen is soft. Tenderness: There is no abdominal tenderness. Comments: No HSM   Musculoskeletal:         General: No swelling. Lymphadenopathy:      Cervical: No cervical adenopathy. Skin:     General: Skin is warm and dry. Neurological:      General: No focal deficit present. Mental Status: She is alert. Gait: Gait normal.   Psychiatric:         Behavior: Behavior normal.         Thought Content: Thought content normal.         ASSESSMENT/PLAN:    There are no diagnoses linked to this encounter. Orders Placed This Encounter   Medications    liraglutide-weight management 18 MG/3ML SOPN     Sig: .6 mg sq daily for 1 week, 1.2 mg sq daily for 1 week , 1.8mg  sq daily for a week,  2.4 mg for a week then finally 3 mg daily to continue     Dispense:  4 pen     Refill:  1        Return in about 4 weeks (around 9/17/2020), or if symptoms worsen or fail to improve. There are no Patient Instructions on file for this visit.

## 2020-08-30 ASSESSMENT — ENCOUNTER SYMPTOMS
NAUSEA: 0
SHORTNESS OF BREATH: 0
ABDOMINAL PAIN: 0
COUGH: 0
VOMITING: 0

## 2020-09-02 ENCOUNTER — TELEPHONE (OUTPATIENT)
Dept: FAMILY MEDICINE CLINIC | Age: 30
End: 2020-09-02

## 2020-09-02 NOTE — TELEPHONE ENCOUNTER
Lm for patient letting her know that Markie Gallardo is Not Covered by health plan and does she want to try a weight loss center. Instructed to call and let us know.

## 2020-09-22 ENCOUNTER — TELEPHONE (OUTPATIENT)
Dept: FAMILY MEDICINE CLINIC | Age: 30
End: 2020-09-22

## 2020-09-22 NOTE — TELEPHONE ENCOUNTER
Pt is due to get her Be Well Within physical but her last physical was Oct. 24, 2019. Not sure when the be well within is due. Pt would like to know if we could use the physical from last year and she will get bw done so she can complete the Be well within.     Pl advise [phone] (home)

## 2020-09-22 NOTE — TELEPHONE ENCOUNTER
Spoke with the patient, let her know that Dr Michael Gibbons can order her be well within labs and she can get done at the hospital.  Patient is going to check to see if the physical exam needs to be done in 2020 or if she can use her school physical she had done in Oct for the be well within form.

## 2020-12-03 ENCOUNTER — OFFICE VISIT (OUTPATIENT)
Dept: FAMILY MEDICINE CLINIC | Age: 30
End: 2020-12-03
Payer: COMMERCIAL

## 2020-12-03 VITALS
OXYGEN SATURATION: 98 % | TEMPERATURE: 97 F | SYSTOLIC BLOOD PRESSURE: 116 MMHG | HEIGHT: 61 IN | WEIGHT: 211.4 LBS | HEART RATE: 82 BPM | BODY MASS INDEX: 39.91 KG/M2 | DIASTOLIC BLOOD PRESSURE: 76 MMHG

## 2020-12-03 PROCEDURE — 99213 OFFICE O/P EST LOW 20 MIN: CPT | Performed by: INTERNAL MEDICINE

## 2020-12-03 RX ORDER — RANITIDINE 300 MG/1
300 TABLET ORAL NIGHTLY
Qty: 90 TABLET | Refills: 1 | Status: SHIPPED | OUTPATIENT
Start: 2020-12-03 | End: 2022-03-01

## 2020-12-03 NOTE — PROGRESS NOTES
SUBJECTIVE:  Adriana Christopher is a 35 y.o. female being evaluated for:    Chief Complaint   Patient presents with    Finger Injury     pt states tripped on gravel and feel onto her right hand an jammed her ring finger. c/o swelling/pain/stiffness. taking ibu and icing. HPI   Right   Slipped and fell on gravel in November and finger jammed into the gravel  Was swollen and purple  Using ibuprofen  Trouble bending it all the swy      No Known Allergies  Current Outpatient Medications   Medication Sig Dispense Refill    raNITIdine (ZANTAC) 300 MG tablet Take 1 tablet by mouth nightly 90 tablet 1    ibuprofen (ADVIL;MOTRIN) 200 MG tablet Take 200 mg by mouth every 6 hours as needed for Pain       cetirizine (ZYRTEC) 10 MG tablet Take 1 tablet by mouth daily 30 tablet 0    etonogestrel (NEXPLANON) 68 MG implant 68 mg by Subdermal route once       No current facility-administered medications for this visit. Social History     Socioeconomic History    Marital status: Single     Spouse name: Not on file    Number of children: Not on file    Years of education: Not on file    Highest education level: Not on file   Occupational History    Not on file   Social Needs    Financial resource strain: Not on file    Food insecurity     Worry: Not on file     Inability: Not on file    Transportation needs     Medical: Not on file     Non-medical: Not on file   Tobacco Use    Smoking status: Never Smoker    Smokeless tobacco: Never Used   Substance and Sexual Activity    Alcohol use:  Yes     Alcohol/week: 0.0 standard drinks     Comment: social use    Drug use: No    Sexual activity: Yes     Partners: Male   Lifestyle    Physical activity     Days per week: Not on file     Minutes per session: Not on file    Stress: Not on file   Relationships    Social connections     Talks on phone: Not on file     Gets together: Not on file     Attends Protestant service: Not on file Active member of club or organization: Not on file     Attends meetings of clubs or organizations: Not on file     Relationship status: Not on file    Intimate partner violence     Fear of current or ex partner: Not on file     Emotionally abused: Not on file     Physically abused: Not on file     Forced sexual activity: Not on file   Other Topics Concern    Not on file   Social History Narrative    Not on file      Past Medical History:   Diagnosis Date    GERD (gastroesophageal reflux disease)     Obesity      Past Surgical History:   Procedure Laterality Date    ANKLE SURGERY Left     tendon injury and scar tissue removal     WISDOM TOOTH EXTRACTION         Review of Systems   Constitutional: Negative for fever. Gastrointestinal: Negative for abdominal pain, blood in stool, diarrhea, nausea and vomiting. Genitourinary: Negative for menstrual problem (not pregnant ). Musculoskeletal: Positive for arthralgias. OBJECTIVE:  /76   Pulse 82   Temp 97 °F (36.1 °C) (Temporal)   Ht 5' 1\" (1.549 m)   Wt 211 lb 6.4 oz (95.9 kg)   LMP  (LMP Unknown)   SpO2 98%   Breastfeeding No   BMI 39.94 kg/m²      Body mass index is 39.94 kg/m². Physical Exam  Constitutional:       Appearance: Normal appearance. HENT:      Head: Normocephalic and atraumatic. Eyes:      Conjunctiva/sclera: Conjunctivae normal.   Neck:      Musculoskeletal: Neck supple. No muscular tenderness. Cardiovascular:      Rate and Rhythm: Normal rate and regular rhythm. Pulmonary:      Effort: Pulmonary effort is normal.      Breath sounds: Normal breath sounds. Abdominal:      General: There is no distension. Palpations: Abdomen is soft. Tenderness: There is no abdominal tenderness. Comments: NO HSM    Musculoskeletal:         General: Swelling and tenderness present. Comments: Right 4th pip joint    Lymphadenopathy:      Cervical: No cervical adenopathy.    Skin: General: Skin is warm and dry. Neurological:      General: No focal deficit present. ASSESSMENT/PLAN:    [facility] was [facility] today for finger injury. Diagnoses and all orders for this visit:    Injury of finger of right hand, initial encounter  -     Sophy Correa MD, Hand Surgery (Hand, Wrist, Elbow), [place]  -     XR FINGER RIGHT (MIN 2 VIEWS); Future    Gastroesophageal reflux disease without esophagitis  -     raNITIdine (ZANTAC) 300 MG tablet; Take 1 tablet by mouth nightly        Orders Placed This Encounter   Medications    raNITIdine (ZANTAC) 300 MG tablet     Sig: Take 1 tablet by mouth nightly     Dispense:  90 tablet     Refill:  1        Return if symptoms worsen or fail to improve. There are no Patient Instructions on file for this visit.

## 2020-12-13 ASSESSMENT — ENCOUNTER SYMPTOMS
VOMITING: 0
ABDOMINAL PAIN: 0
BLOOD IN STOOL: 0
DIARRHEA: 0
NAUSEA: 0

## 2020-12-14 ENCOUNTER — OFFICE VISIT (OUTPATIENT)
Dept: ORTHOPEDIC SURGERY | Age: 30
End: 2020-12-14
Payer: COMMERCIAL

## 2020-12-14 VITALS — WEIGHT: 211 LBS | BODY MASS INDEX: 39.84 KG/M2 | HEIGHT: 61 IN

## 2020-12-14 PROCEDURE — 99203 OFFICE O/P NEW LOW 30 MIN: CPT | Performed by: ORTHOPAEDIC SURGERY

## 2020-12-14 NOTE — PROGRESS NOTES
Inspection: Moderate swelling near the PIP joint right ring finger with no swelling throughout the remainder of the finger or hand  There is a very slight amount what appears to be malrotation compared with contralateral ring finger with the fingers held in flexion no obvious angulation  No swan-neck or boutonniere deformity no other skin changes    Palpation: Mild tenderness to palpation specifically near the radial aspect PIP joint and volar aspect with less tenderness  No tenderness throughout the remainder of the finger or hand    Range of Motion: Near full extension actively with less than 5 degree flexion contracture and attempted flexion pulp to palm distance is approximately 1-1/2 cm  No crepitus or gross instability with attempted range of motion  Strength: 5-5 strength EPL FPL thumb adduction  5-5 FDS FDP EDC interossei  Negative Lobito's test right ring finger    Special Tests: Gross sensation intact radial and ulnar aspect of fingertip  Brisk capillary refill at fingertip  Grossly stable right ring finger PIP joint with radial and ulnar stress at full extension and at 30 degrees of flexion with mild discomfort particularly with radial collateral ligament stress    Skin: There are no additional worrisome rashes, ulcerations or lesions. Gait: normal    Circulation: well perfused        Additional Comments:     Additional Examinations:  Left Upper Extremity: Examination of the left upper extremity does not show any tenderness, deformity or injury. Range of motion is unremarkable. There is no gross instability. There are no rashes, ulcerations or lesions. Strength and tone are normal.      Radiology:     X-rays obtained and reviewed in office:  Views 3  Location right ring finger  Impression: Concentric PIP joint, no obvious acute fracture and no obvious intra-articular step-off. No evidence of subluxation of PIP joint or dislocation.   No additional acute osseous abnormality

## 2020-12-17 ENCOUNTER — HOSPITAL ENCOUNTER (OUTPATIENT)
Dept: OCCUPATIONAL THERAPY | Age: 30
Setting detail: THERAPIES SERIES
Discharge: HOME | End: 2020-12-17
Payer: COMMERCIAL

## 2020-12-17 PROCEDURE — 97035 APP MDLTY 1+ULTRASOUND EA 15: CPT | Performed by: OCCUPATIONAL THERAPIST

## 2020-12-17 PROCEDURE — 97165 OT EVAL LOW COMPLEX 30 MIN: CPT | Performed by: OCCUPATIONAL THERAPIST

## 2020-12-17 PROCEDURE — 97110 THERAPEUTIC EXERCISES: CPT | Performed by: OCCUPATIONAL THERAPIST

## 2020-12-17 NOTE — FLOWSHEET NOTE
[facility] [place]  [address],  [address], [address]  Phone: [phone] Fax: [phone]      Occupational Therapy Treatment Note/ Progress Report:     Date:  2020    Patient Name:  Tete Polanco    :  1990  MRN: [id number]    Medical/Treatment Diagnosis Information:  · Diagnosis: R ring PIPJ sprain (W80.297J)   · Treatment Diagnosis: G45.669     Insurance/Certification information:  OT Insurance Information: [employer] [facility]  Physician Information:  Referring Practitioner: Dr. Rosa Gold  Has the plan of care been signed (Y/N):        []  Yes  [x]  No       Visit # Insurance Allowable Auth Required   1 Med necessity []  Yes [x]  No        Is this a Progress Report:     []  Yes  [x]  No      If Yes:  Date Range for reporting period:  Beginning  Ending    Progress report will be due (10 Rx or 30 days whichever is less): 81     Recertification will be due (POC Duration  / 90 days whichever is less): 3/17/21     Date of Injury: 20  Date of Surgery: N/A    Date of Patient follow up with Physician: 4 weeks, following therapy    RESTRICTIONS/PRECAUTIONS: none    Latex Allergy:  [x]No      []Yes  Pacemaker:  [x] No       [] Yes     Preferred Language for Healthcare:   [x]English       []other:      Functional Scale: 30% (Quick DASH)   Date assessed:  2020    SUBJECTIVE: Injured in November falling onto hand. Did not seek treatment at that time; noticed weakness with opening lids at work or home. Referred following recent ortho consult. Pain Scale: 310    OBJECTIVE:       Date:   20     Objective Measures/Tests:      ROM:      Ring finger AROM 0/72  0/92  0/55                 Strength: R30 L47           Observations:        Other:                  MODALITIES:      Fluidotherapy (31590)      Estim (98325/59146)      Paraffin (66226)      US (23767) 8' PIPJ     Iontophoresis ()      Hot Pack 10' Cold Pack            INTERVENTIONS:      Therapeutic Exercise (42954)                              Therapeutic Activity (72494)                              Manual Therapy (08657)                  Neuromuscular Reeducation (39871)                  ADL Training (38819)                  HEP Training/Review Issued green foam block for squeezes                 Splinting      Lcode:      Orthotic Mgmt, Subsequent Enc (02924)      Orthotic Mgmt & Training (02916)            Other: Delwyn Nett for edema                               Therapeutic Exercise & NMR:  [] (38277) Provided verbal/tactile cueing for activities related to strengthening, flexibility, endurance, ROM  for improvements in scapular, scapulothoracic and UE control with self care, reaching, carrying, lifting, house/yardwork, driving/computer work.    [] (26076) Provided verbal/tactile cueing for activities related to improving balance, coordination, kinesthetic sense, posture, motor skill, proprioception  to assist with  scapular, scapulothoracic and UE control with self care, reaching, carrying, lifting, house/yardwork, driving/computer work.     Therapeutic Activities & NMR:    [] (81286 or 77289) Provided verbal/tactile cueing for activities related to improving balance, coordination, kinesthetic sense, posture, motor skill, proprioception and motor activation to allow for proper function of scapular, scapulothoracic and UE control with self care, carrying, lifting, driving/computer work    Home Exercise Program:    [] (85200) Reviewed/Progressed HEP activities related to strengthening, flexibility, endurance, ROM of scapular, scapulothoracic and UE control with self care, reaching, carrying, lifting, house/yardwork, driving/computer work 2. Patient will have a decrease in pain to facilitate improvement in movement, function, and ADLs as indicated by Functional Deficits. []? Progressing: []? Met: []? Not Met: []? Adjusted     Long Term Goals to be achieved in 4 weeks (through 1/17/21), including patient directed goals to address patient identified performance deficits:  1) Pt to be independent in graded HEP progression with a good level of effort and compliance. []? Progressing: []? Met: []? Not Met: []? Adjusted   2) Pt to report a score of </= 20 % on the Quick DASH disability questionnaire for increased performance with carrying, moving, and handling objects. []? Progressing: []? Met: []? Not Met: []? Adjusted   3) Pt will demonstrate increased ring ROM by 15 degrees for improved independence with gross grasp. []? Progressing: []? Met: []? Not Met: []? Adjusted   4) Pt will demonstrate increased strength by 15-20# for improved independence with heavier tasks at work, opening jar lids. []? Progressing: []? Met: []? Not Met: []? Adjusted   5) Pt will have a decrease in pain to 2/10 with use to facilitate return to normal functional use patterns during day. []? Progressing: []? Met: []? Not Met: []? Adjusted      Overall Progression Towards Functional Goals/Treatment Progress Update:  [] Patient is progressing as expected towards functional goals listed. [] Progression is slowed due to complexities/impairments listed. [] Progression has been slowed due to co-morbidities.   [x] Plan just implemented, too soon to assess goals progression <30 days  [] Goals require adjustment due to lack of progress  [] Patient is not progressing as expected and requires additional follow up with physician  [] All goals are met  [] Other:     Prognosis for POC: [x] Good [] Fair  [] Poor    Patient requires continued skilled intervention: [x] Yes  [] No    Treatment/Activity Tolerance:  [x] Patient able to complete treatment  [] Patient limited by fatigue [] Patient limited by pain    [] Patient limited by other medical complications  [] Other:                  PLAN: See eval  [] Continue per plan of care [] Alter current plan (see comments above)  [x] Plan of care initiated [] Hold pending MD visit [] Discharge      Electronically signed by:  Juliette JUNG/L, [facility]      Note: If patient does not return for scheduled/ recommended follow up visits, this note will serve as a discharge from care along with most recent update on progress.

## 2020-12-22 ENCOUNTER — HOSPITAL ENCOUNTER (OUTPATIENT)
Dept: OCCUPATIONAL THERAPY | Age: 30
Setting detail: THERAPIES SERIES
Discharge: HOME | End: 2020-12-22
Payer: COMMERCIAL

## 2020-12-22 PROCEDURE — 97110 THERAPEUTIC EXERCISES: CPT | Performed by: OCCUPATIONAL THERAPIST

## 2020-12-22 PROCEDURE — 97530 THERAPEUTIC ACTIVITIES: CPT | Performed by: OCCUPATIONAL THERAPIST

## 2020-12-22 PROCEDURE — 97022 WHIRLPOOL THERAPY: CPT | Performed by: OCCUPATIONAL THERAPIST

## 2020-12-22 NOTE — FLOWSHEET NOTE
[address] [facility], [facility]  [address], [address], [address] [facility]  Phone: [phone] Fax: [phone]      Occupational Therapy Treatment Note/ Progress Report:     Date:  2020    Patient Name:  Cali Ho    :  1990  MRN: [id number]    Medical/Treatment Diagnosis Information:  · Diagnosis: R ring PIPJ sprain (J18.189U)   · Treatment Diagnosis: Q30.771     Insurance/Certification information:  OT Insurance Information: [employer] [place]  Physician Information:  Referring Practitioner: Dr. Dmitry Howell  Has the plan of care been signed (Y/N):        []  Yes  [x]  No       Visit # Insurance Allowable Auth Required   2 Med necessity []  Yes [x]  No        Is this a Progress Report:     []  Yes  [x]  No      If Yes:  Date Range for reporting period:  Beginning  Ending    Progress report will be due (10 Rx or 30 days whichever is less):      Recertification will be due (POC Duration  / 90 days whichever is less): 3/17/21     Date of Injury: 20  Date of Surgery: N/A    Date of Patient follow up with Physician: 4 weeks, following therapy    RESTRICTIONS/PRECAUTIONS: none    Latex Allergy:  [x]No      []Yes  Pacemaker:  [x] No       [] Yes     Preferred Language for Healthcare:   [x]English       []other:      Functional Scale: 30% (Quick DASH)   Date assessed:  2020    SUBJECTIVE: Compliant with HEP; mild soreness this week as she has worked 4 (12hour shifts) days in a row    Injured in November falling onto hand. Did not seek treatment at that time; noticed weakness with opening lids at work or home. Referred following recent ortho consult.      Pain Scale: 3/10    OBJECTIVE:       Date:   20    Objective Measures/Tests:      ROM:      Ring finger AROM 0/72  0/92  0/55  PAGE - 219 0/74  0/106  0/59  PAGE - 239    (VS L 0/81  0/98  0/60  PAGE - 239)                Strength: R30 L47 R 34   L 34          Observations: Other: Edema RF PIP  R 5.7cm L 5.4cm                MODALITIES:      Fluidotherapy (10218)  11'    Estim (02705/40492)      Paraffin (66073)      US (55904) 8' PIPJ     Iontophoresis (74715)      Hot Pack 10'     Cold Pack            INTERVENTIONS:      Therapeutic Exercise (13981)      AROM  10x2 each hook, full fist, flat fist; instructed per [facility] HEP, see below                      Therapeutic Activity (54034)      Sponge Exercises  Reviewed sponge exercises - squeeze, flat , pinching x 10 each    Small objects  Controlled release of beads from ulnar hand x 2 (~30 beads)    Use of tea strainer, red hand gripper to /release small foam blocks x 30 each                Manual Therapy (62982)  3' STM, retrograde massage; issued digisleeve for edema control                Neuromuscular Reeducation (17355)  Cueing for exercise technique                ADL Training (14540)  Instructed on diagnosis specific anatomy, joint protection, and ADL modifications                  HEP Training/Review Issued green foam block for squeezes Access Code: [email or web address]   URL: Showcase/   Date: 12/22/2020   Prepared by:  Ida Appiah     Exercises   Hook AROM - 10 reps - 3-4x daily - 7x weekly   Full Fist - 10 reps - 3-4x daily - 7x weekly   Flat Fist - 10 reps - 3-4x daily - 7x weekly   Sponge squeezes - 10 reps - 1-2x daily - 7x weekly   Tip Pinch Strengthening - 10 reps - 1-2x daily - 7x weekly   Flat  - 10 reps - 1-2x daily - 7x weekly                   Splinting  Issued mark tape for LF/RF for added support during tasks, recommended pt paper tape digits after application of gloves during work duties for added support/protection    Lcode:      Orthotic Mgmt, Subsequent Enc (86336)      Orthotic Mgmt & Training (11346)            Other: Sanjay Castellon for edema  issued digisleeve for edema control                              Therapeutic Exercise & NMR:  [x] (27719) Provided verbal/tactile cueing for activities related to strengthening, flexibility, endurance, ROM  for improvements in scapular, scapulothoracic and UE control with self care, reaching, carrying, lifting, house/yardwork, driving/computer work. [x] (88115) Provided verbal/tactile cueing for activities related to improving balance, coordination, kinesthetic sense, posture, motor skill, proprioception  to assist with  scapular, scapulothoracic and UE control with self care, reaching, carrying, lifting, house/yardwork, driving/computer work.     Therapeutic Activities & NMR:    [x] (66833 or 14655) Provided verbal/tactile cueing for activities related to improving balance, coordination, kinesthetic sense, posture, motor skill, proprioception and motor activation to allow for proper function of scapular, scapulothoracic and UE control with self care, carrying, lifting, driving/computer work    Home Exercise Program:    [x] (19533) Reviewed/Progressed HEP activities related to strengthening, flexibility, endurance, ROM of scapular, scapulothoracic and UE control with self care, reaching, carrying, lifting, house/yardwork, driving/computer work  [] (32212) Reviewed/Progressed HEP activities related to improving balance, coordination, kinesthetic sense, posture, motor skill, proprioception of scapular, scapulothoracic and UE control with self care, reaching, carrying, lifting, house/yardwork, driving/computer work      Manual Treatments:  PROM / STM / Oscillations-Mobs:  G-I, II, III, IV (PA's, Inf., Post.)  [x] (54387) Provided manual therapy to mobilize soft tissue/joints of cervical/CT, scapular GHJ and UE for the purpose of modulating pain, promoting relaxation,  increasing ROM, reducing/eliminating soft tissue swelling/inflammation/restriction, improving soft tissue extensibility and allowing for proper ROM for normal function with self care, reaching, carrying, lifting, house/yardwork, driving/computer work    ADL Training:  [x] (23947) Provided self-care/home management training related to activities of daily living and compensatory training, and/or use of adaptive equipment      Charges:  Timed Code Treatment Minutes: 34   Total Treatment Minutes: 45   Worker's Comp: Time In/Time Out     [] EVAL (LOW) 55868 (typically 20 minutes face-to-face)    [] EVAL (MOD) 78535 (typically 30 minutes face-to-face)  [] EVAL (HIGH) 39991 (typically 45 minutes face-to-face)  [] OT Re-eval (46320)       [x] Raffi [phone] x 1     [] CEQSH(29483)  [] NMR (17761) x      [] Estim (attended) (39640)   [] Manual ([phone]) x      [] US (79784)  [x] TA (23242) x 1     [] Paraffin (90828)  [] ADL  (42387) x     [] Splint/L code:    [] Estim (unattended) [phone])  [x] Fluidotherapy (43172)  [] Other:      ASSESSMENT:  Notably improved AROM, increased strength noted    GOALS: Patient stated goal: regain strength, flexibility    [x]? Progressing: []? Met: []? Not Met: []? Adjusted     Therapist goals for Patient:   Short Term Goals: To be achieved in: 2 weeks  1. Independent in HEP and progression per patient tolerance, in order to prevent re-injury. [x]? Progressing: []? Met: []? Not Met: []? Adjusted   2. Patient will have a decrease in pain to facilitate improvement in movement, function, and ADLs as indicated by Functional Deficits. [x]? Progressing: []? Met: []? Not Met: []? Adjusted     Long Term Goals to be achieved in 4 weeks (through 1/17/21), including patient directed goals to address patient identified performance deficits:  1) Pt to be independent in graded HEP progression with a good level of effort and compliance. [x]? Progressing: []? Met: []? Not Met: []? Adjusted   2) Pt to report a score of </= 20 % on the Quick DASH disability questionnaire for increased performance with carrying, moving, and handling objects. [x]? Progressing: []? Met: []? Not Met: []?  Adjusted   3) Pt will demonstrate increased ring ROM by 15 degrees for improved independence with gross grasp. []? Progressing: [x]? Met:12/22/2020  []? Not Met: []? Adjusted   4) Pt will demonstrate increased strength by 15-20# for improved independence with heavier tasks at work, opening jar lids. [x]? Progressing: []? Met: []? Not Met: []? Adjusted   5) Pt will have a decrease in pain to 2/10 with use to facilitate return to normal functional use patterns during day. [x]? Progressing: []? Met: []? Not Met: []? Adjusted      Overall Progression Towards Functional Goals/Treatment Progress Update:  [x] Patient is progressing as expected towards functional goals listed. [] Progression is slowed due to complexities/impairments listed. [] Progression has been slowed due to co-morbidities. [] Plan just implemented, too soon to assess goals progression <30 days  [] Goals require adjustment due to lack of progress  [] Patient is not progressing as expected and requires additional follow up with physician  [] All goals are met  [x] Other: goal 3 met    Prognosis for POC: [x] Good [] Fair  [] Poor    Patient requires continued skilled intervention: [x] Yes  [] No    Treatment/Activity Tolerance:  [x] Patient able to complete treatment  [] Patient limited by fatigue  [] Patient limited by pain    [] Patient limited by other medical complications  [] Other:                  PLAN:   [x] Continue per plan of care [] Alter current plan (see comments above)  [] Plan of care initiated [] Hold pending MD visit [] Discharge      Electronically signed by: Ida Dick OTR/L, PT, MSPT, CHT, [id number], [id number]        Note: If patient does not return for scheduled/ recommended follow up visits, this note will serve as a discharge from care along with most recent update on progress.

## 2020-12-29 ENCOUNTER — HOSPITAL ENCOUNTER (OUTPATIENT)
Dept: OCCUPATIONAL THERAPY | Age: 30
Setting detail: THERAPIES SERIES
End: 2020-12-29
Payer: COMMERCIAL

## 2021-01-07 ENCOUNTER — HOSPITAL ENCOUNTER (OUTPATIENT)
Dept: OCCUPATIONAL THERAPY | Age: 31
Setting detail: THERAPIES SERIES
Discharge: HOME | End: 2021-01-07
Payer: COMMERCIAL

## 2021-01-07 PROCEDURE — 97530 THERAPEUTIC ACTIVITIES: CPT | Performed by: OCCUPATIONAL THERAPIST

## 2021-01-07 PROCEDURE — 97110 THERAPEUTIC EXERCISES: CPT | Performed by: OCCUPATIONAL THERAPIST

## 2021-01-07 PROCEDURE — 97022 WHIRLPOOL THERAPY: CPT | Performed by: OCCUPATIONAL THERAPIST

## 2021-01-07 PROCEDURE — 97140 MANUAL THERAPY 1/> REGIONS: CPT | Performed by: OCCUPATIONAL THERAPIST

## 2021-01-07 NOTE — FLOWSHEET NOTE
[facility], [place]  [address], [address] [facility], [address] [place] [address]  Phone: [phone] Fax: [phone]      Occupational Therapy Treatment Note/ Progress Report:     Date:  2021    Patient Name:  Homa Lockwood    :  1990  MRN: [id number]    Medical/Treatment Diagnosis Information:  · Diagnosis: R ring PIPJ sprain (A87.405G)   · Treatment Diagnosis: F52.625     Insurance/Certification information:  OT Insurance Information: Medical [place]  Physician Information:  Referring Practitioner: Dr. Luiz Christine  Has the plan of care been signed (Y/N):        []  Yes  [x]  No       Visit # Insurance Allowable Auth Required   3 Med necessity []  Yes [x]  No        Is this a Progress Report:     []  Yes  [x]  No      If Yes:  Date Range for reporting period:  Beginning  Ending    Progress report will be due (10 Rx or 30 days whichever is less):      Recertification will be due (POC Duration  / 90 days whichever is less): 3/17/21     Date of Injury: 20  Date of Surgery: N/A    Date of Patient follow up with Physician: 4 weeks, following therapy    RESTRICTIONS/PRECAUTIONS: none    Latex Allergy:  [x]No      []Yes  Pacemaker:  [x] No       [] Yes     Preferred Language for Healthcare:   [x]English       []other:      Functional Scale: 30% (Quick DASH)   Date assessed:  2021    SUBJECTIVE: Compliant with HEP; intermittent soreness when hit, bumped    Injured in November falling onto hand. Did not seek treatment at that time; noticed weakness with opening lids at work or home. Referred following recent ortho consult.      Pain Scale: 0/10 at rest, intermittent 1-2/10 at times due to aching/stiffness    OBJECTIVE:     Arrived late for appt  Date:   20   Objective Measures/Tests:      ROM:      RF MP  PIP  DIP   0/72  0/92  0/55  PAGE - 219 0/74  0/106  0/59  PAGE - 239    (VS L 0/81  0/98  0/60  PAGE - 239) 0/81  0/104  0/64 PAGE - 249               Strength: R30 L47 R 34   L 34 R  42   L 30  10    10  8      8  5      3         Observations: Other: Edema RF PIP  R 5.7cm L 5.4cm 5.6cm               MODALITIES:      Fluidotherapy (26917)  11' 15'   Estim (99766/98273)      Paraffin (04515)      US (28325) 8' PIPJ     Iontophoresis (79481)      Hot Pack 10'     Cold Pack            INTERVENTIONS:      Therapeutic Exercise (29787)      AROM  10x2 each hook, full fist, flat fist; [facility], see below 10x2 each hook, full fist, flat fist    10x2 intrinsic + (using relative motion technique with pen, long Qtip)       AA/PROM   Intrinsic stretch x 10, gentle composite flexion x 10, PIP extension stretch x 10               Therapeutic Activity (73207)      Sponge Exercises  Reviewed sponge exercises - squeeze, flat , pinching x 10 each      Small objects  Controlled release of beads from ulnar hand x 2 (~30 beads)    Use of tea strainer, red hand gripper to /release small foam blocks x 30 each    Putty   Gripping yellow putty x 10, RF tip pinching x 10; removal of 15 small pegs from yellow putty     IP rolling   Use of progressingly smaller object for IP rolling/intrinsic stretching x 10 each (highlighter, pen, long Qtip)     Manual Therapy (37688)  3' STM, retrograde massage; issued digisleeve for edema control 8' STM, retrograde massage, AA/PROM, gentle joint mobilization               Neuromuscular Reeducation (07783)  Cueing for exercise technique Cueing for exercise technique               ADL Training (47530)  Instructed on diagnosis specific anatomy, joint protection, and ADL modifications                  HEP Training/Review Issued green foam block for squeezes Access Code: [email or web address]   URL: [email or web address]   Date: 12/22/2020   Prepared by:  Ida Appiah     Exercises   Hook AROM - 10 reps - 3-4x daily - 7x weekly   Full Fist - 10 reps - 3-4x daily - 7x weekly Flat Fist - 10 reps - 3-4x daily - 7x weekly   Sponge squeezes - 10 reps - 1-2x daily - 7x weekly   Tip Pinch Strengthening - 10 reps - 1-2x daily - 7x weekly   Flat  - 10 reps - 1-2x daily - 7x weekly    Reviewed                Splinting  Issued mark tape for LF/RF for added support during tasks, recommended pt paper tape digits after application of gloves during work duties for added support/protection    Lcode:      Orthotic Mgmt, Subsequent Enc (04212)      Orthotic Mgmt & Training (22778)            Other: Osmany Bowers for edema  issued digisleeve for edema control                              Therapeutic Exercise & NMR:  [x] (91276) Provided verbal/tactile cueing for activities related to strengthening, flexibility, endurance, ROM  for improvements in scapular, scapulothoracic and UE control with self care, reaching, carrying, lifting, house/yardwork, driving/computer work. [x] (64653) Provided verbal/tactile cueing for activities related to improving balance, coordination, kinesthetic sense, posture, motor skill, proprioception  to assist with  scapular, scapulothoracic and UE control with self care, reaching, carrying, lifting, house/yardwork, driving/computer work.     Therapeutic Activities & NMR:    [x] (44664 or 93787) Provided verbal/tactile cueing for activities related to improving balance, coordination, kinesthetic sense, posture, motor skill, proprioception and motor activation to allow for proper function of scapular, scapulothoracic and UE control with self care, carrying, lifting, driving/computer work    Home Exercise Program:    [x] (40153) Reviewed/Progressed HEP activities related to strengthening, flexibility, endurance, ROM of scapular, scapulothoracic and UE control with self care, reaching, carrying, lifting, house/yardwork, driving/computer work [] (41158) Reviewed/Progressed HEP activities related to improving balance, coordination, kinesthetic sense, posture, motor skill, proprioception of scapular, scapulothoracic and UE control with self care, reaching, carrying, lifting, house/yardwork, driving/computer work      Manual Treatments:  PROM / STM / Oscillations-Mobs:  G-I, II, III, IV (PA's, Inf., Post.)  [x] (08854) Provided manual therapy to mobilize soft tissue/joints of cervical/CT, scapular GHJ and UE for the purpose of modulating pain, promoting relaxation,  increasing ROM, reducing/eliminating soft tissue swelling/inflammation/restriction, improving soft tissue extensibility and allowing for proper ROM for normal function with self care, reaching, carrying, lifting, house/yardwork, driving/computer work    ADL Training:  [x] (52987) Provided self-care/home management training related to activities of daily living and compensatory training, and/or use of adaptive equipment      Charges:  Timed Code Treatment Minutes: 40   Total Treatment Minutes: 55   Worker's Comp: Time In/Time Out     [] EVAL (LOW) 33126 (typically 20 minutes face-to-face)    [] EVAL (MOD) 35102 (typically 30 minutes face-to-face)  [] EVAL (HIGH) 20739 (typically 45 minutes face-to-face)  [] OT Re-eval (37797)       [x] Raffi ([id number]) x 1     [] PGAZQ(14610)  [] NMR (10472) x      [] Estim (attended) (00607)   [] Manual ([phone]) x      [] US (55827)  [x] TA (49358) x 1     [] Paraffin (14244)  [] ADL  (10101) x     [] Splint/L code:    [] Estim (unattended) [phone]  [x] Fluidotherapy (24962)  [] Other:      ASSESSMENT:  ROM continues to improve, pain decreased    GOALS: Patient stated goal: regain strength, flexibility    [x]? Progressing: []? Met: []? Not Met: []? Adjusted     Therapist goals for Patient:   Short Term Goals: To be achieved in: 2 weeks  1. Independent in HEP and progression per patient tolerance, in order to prevent re-injury. [x]? Progressing: []? Met: []? Not Met: []? Adjusted   2. Patient will have a decrease in pain to facilitate improvement in movement, function, and ADLs as indicated by Functional Deficits. [x]? Progressing: []? Met: []? Not Met: []? Adjusted     Long Term Goals to be achieved in 4 weeks (through 1/17/21), including patient directed goals to address patient identified performance deficits:  1) Pt to be independent in graded HEP progression with a good level of effort and compliance. [x]? Progressing: []? Met: []? Not Met: []? Adjusted   2) Pt to report a score of </= 20 % on the Quick DASH disability questionnaire for increased performance with carrying, moving, and handling objects. [x]? Progressing: []? Met: []? Not Met: []? Adjusted   3) Pt will demonstrate increased ring ROM by 15 degrees for improved independence with gross grasp. []? Progressing: [x]? Met:12/22/2020  []? Not Met: []? Adjusted   4) Pt will demonstrate increased strength by 15-20# for improved independence with heavier tasks at work, opening jar lids. [x]? Progressing: []? Met: []? Not Met: []? Adjusted   5) Pt will have a decrease in pain to 2/10 with use to facilitate return to normal functional use patterns during day. []? Progressing: [x]? Met:1/7/2021  []? Not Met: []? Adjusted      Overall Progression Towards Functional Goals/Treatment Progress Update:  [x] Patient is progressing as expected towards functional goals listed. [] Progression is slowed due to complexities/impairments listed. [] Progression has been slowed due to co-morbidities.   [] Plan just implemented, too soon to assess goals progression <30 days  [] Goals require adjustment due to lack of progress  [] Patient is not progressing as expected and requires additional follow up with physician  [] All goals are met  [x] Other: goal 5 met    Prognosis for POC: [x] Good [] Fair  [] Poor    Patient requires continued skilled intervention: [x] Yes  [] No Treatment/Activity Tolerance:  [x] Patient able to complete treatment  [] Patient limited by fatigue  [] Patient limited by pain    [] Patient limited by other medical complications  [] Other:                  PLAN:   [x] Continue per plan of care [] Alter current plan (see comments above)  [] Plan of care initiated [] Hold pending MD visit [] Discharge      Electronically signed by: Ida Zambrano OTR/L, PT, MSPT, CHT, [id number], [id number]        Note: If patient does not return for scheduled/ recommended follow up visits, this note will serve as a discharge from care along with most recent update on progress.

## 2021-01-11 ENCOUNTER — OFFICE VISIT (OUTPATIENT)
Dept: ORTHOPEDIC SURGERY | Age: 31
End: 2021-01-11
Payer: COMMERCIAL

## 2021-01-11 ENCOUNTER — HOSPITAL ENCOUNTER (OUTPATIENT)
Dept: OCCUPATIONAL THERAPY | Age: 31
Setting detail: THERAPIES SERIES
Discharge: HOME | End: 2021-01-11
Payer: COMMERCIAL

## 2021-01-11 DIAGNOSIS — S63.614A SPRAIN OF RIGHT RING FINGER, UNSPECIFIED SITE OF DIGIT, INITIAL ENCOUNTER: Primary | ICD-10-CM

## 2021-01-11 PROCEDURE — 99212 OFFICE O/P EST SF 10 MIN: CPT | Performed by: ORTHOPAEDIC SURGERY

## 2021-01-11 PROCEDURE — 97110 THERAPEUTIC EXERCISES: CPT | Performed by: OCCUPATIONAL THERAPIST

## 2021-01-11 PROCEDURE — 97022 WHIRLPOOL THERAPY: CPT | Performed by: OCCUPATIONAL THERAPIST

## 2021-01-11 PROCEDURE — 97530 THERAPEUTIC ACTIVITIES: CPT | Performed by: OCCUPATIONAL THERAPIST

## 2021-01-11 NOTE — PROGRESS NOTES
Assessment: A 76-year-old female presenting with history of right ring finger injury after a fall landing awkwardly onto the finger  1. Right ring finger PIP joint sprain, possible subacute/chronic periarticular fracture with no evidence of gross joint instability    Treatment Plan: The patient does appear to have improvement in both her range of motion and overall swelling. We will continue to treat conservatively. She will transition to home exercise program for her finger for now. She understands possibility of permanent swelling and even some stiffness long-term. Based on her finger exam we did discuss the possibility of some malrotation clinically although radiographically she likely has this periarticular fracture that is difficult to detect the exact fracture plane based on images that were obtained about 6 weeks after her injury  For now she would like to continue with conservative management as well with slow progression of activity as tolerated. We discussed follow-up within the next 6 to 8 weeks particularly if she is having any persistent symptoms or changes    No follow-ups on file. History of Present Illness  Jose Guadalupe Lord is a 35 y.o. female, right-hand-dominant, works as a nurse, presenting with history of right ring finger injury pain and stiffness  Date of injury: Approximately 10 weeks ago  Mechanism of injury: Patient was in a parking lot while hiking in [place] when she slipped on some gravel and feels that she \"jammed\" her right ring finger and hand. Subsequently she did develop some bruising and discomfort mainly near the PIP joint of the right ring finger after the injury with some bruising associated. Since that time she has had some continued stiffness and swelling overall some improvement in the pain but persistent particular with certain gripping or when bumping the area awkwardly.   She has had some difficulty with gripping and lifting and twisting objects with decreased adduction  5-5 FDS FDP EDC interossei  Negative Lobito's test right ring finger     Special Tests: Gross sensation intact radial and ulnar aspect of fingertip  Brisk capillary refill at fingertip  Grossly stable right ring finger PIP joint with radial and ulnar stress at full extension and at 30 degrees of flexion with mild discomfort particularly with radial collateral ligament stress    Capillary refill brisk all fingers, symmetric  Gross sensation intact to light touch median/ulnar/radial nerves  Sensation intact to radial/ulnar aspect of fingertip        Radiology:    X-rays obtained and reviewed in office:  No new x-rays obtained today    Additional Diagnostic Test Findings:    Office Procedures:  No orders of the defined types were placed in this encounter. Aidee Santiago MD  Orthopaedic Surgeon, Hand & Upper Extremity   [facility]    Contact Information:  Carri West: [phone] Clinical )    This dictation was performed with a verbal recognition program [facility]) and it was checked for errors. It is possible that there are still dictated errors within this office note. If so, please bring any errors to my attention for an addendum. All efforts were made to ensure that this office note is accurate.

## 2021-01-11 NOTE — FLOWSHEET NOTE
[address] [facility], [place] [address] [address],  [address], 7 [facility]  Phone: [phone] Fax: [phone]      Occupational Therapy Treatment Note/ Progress Report:     Date:  2021    Patient Name:  Mandie Ramirez    :  1990  MRN: [id number]    Medical/Treatment Diagnosis Information:  · Diagnosis: R ring PIPJ sprain (I22.726F)   · Treatment Diagnosis: F73.134     Insurance/Certification information:  OT Insurance Information: Medical [place]  Physician Information:  Referring Practitioner: Dr. Joseph Sumner  Has the plan of care been signed (Y/N):        []  Yes  [x]  No       Visit # Insurance Allowable Auth Required   4 Med necessity []  Yes [x]  No        Is this a Progress Report:     [x]  Yes  []  No      If Yes:  Date Range for reporting period:  Beginning 20  Ending 21    Progress report will be due (10 Rx or 30 days whichever is less): 18     Recertification will be due (POC Duration  / 90 days whichever is less): 3/17/21     Date of Injury: 20  Date of Surgery: N/A    Date of Patient follow up with Physician: 4 weeks, following therapy    RESTRICTIONS/PRECAUTIONS: none    Latex Allergy:  [x]No      []Yes  Pacemaker:  [x] No       [] Yes     Preferred Language for Healthcare:   [x]English       []other:      Functional Scale: 9% (Quick DASH)   Date assessed:  2021    SUBJECTIVE: Compliant with HEP; doing well, intermittent use of mark tapes     Injured in November falling onto hand. Did not seek treatment at that time; noticed weakness with opening lids at work or home. Referred following recent ortho consult.      Pain Scale: 0/10 at rest, intermittent 1-2/10 at times due to aching/stiffness    OBJECTIVE:     Arrived late for appt  Date:   20   Objective Measures/Tests:       ROM:       RF MP  PIP  DIP   0/72  0/92  0/55  PAGE - 219 0/74  0/106  0/59  PAGE - 239    (VS L 0/81  0/98  0/60  PAGE - 239) 0/81  0/104  0/64  PAGE - 249 0/81  0/105  0/66  PAGE - 252                 Strength: R30 L47 R 34   L 34 R  42   L 30  10    10  8      8  5      3 R 42      L 42  9     10  9.5    8  6       5          Observations:         Other: Edema RF PIP  R 5.7cm L 5.4cm 5.6cm 5.7cm                 MODALITIES:       Fluidotherapy (77876)  11' 15' 15'   Estim (01425/25683)       Paraffin (66196)       US (14608) 8' PIPJ      Iontophoresis (62541)       Hot Pack 10'      Cold Pack              INTERVENTIONS:       Therapeutic Exercise (46419)       AROM  10x2 each hook, full fist, flat fist; instructed per [employer] HEP, see below 10x2 each hook, full fist, flat fist    10x2 intrinsic + (using relative motion technique with pen, long Qtip)     10x2 each hook, full fist, flat fist    10x2 intrinsic +   AA/PROM   Intrinsic stretch x 10, gentle composite flexion x 10, PIP extension stretch x 10 Intrinsic stretch x 10, gentle composite flexion x 10, PIP extension stretch x 10                 Therapeutic Activity (41285)       Sponge Exercises  Reviewed sponge exercises - squeeze, flat , pinching x 10 each       Hand [place]    5 light red bands - 10x3          Small objects  Controlled release of beads from ulnar hand x 2 (~30 beads)    Use of tea strainer, red hand gripper to /release small foam blocks x 30 each  Controlled release of beads from ulnar hand x 2 (~30 beads)     Putty   Gripping yellow putty x 10, RF tip pinching x 10; removal of 15 small pegs from yellow putty   Bimanual task - separation of yellow putty into 10 equal parts    Removal of 8 pegs from soft putty   IP rolling   Use of progressingly smaller object for IP rolling/intrinsic stretching x 10 each (highlighter, pen, long Qtip)      Manual Therapy (92738)  3' STM, retrograde massage; issued digisleeve for edema control 8' STM, retrograde massage, AA/PROM, gentle joint mobilization 3' STM, retrograde massage, aa/prom Neuromuscular Reeducation (77649)  Cueing for exercise technique Cueing for exercise technique                  ADL Training (77834)  Instructed on diagnosis specific anatomy, joint protection, and ADL modifications    Reinforced activity modifications, joint protection                 HEP Training/Review Issued green foam block for squeezes Access Code: [email or web address]   URL: coRank/   Date: 12/22/2020   Prepared by: Ida Appiah     Exercises   Hook AROM - 10 reps - 3-4x daily - 7x weekly   Full Fist - 10 reps - 3-4x daily - 7x weekly   Flat Fist - 10 reps - 3-4x daily - 7x weekly   Sponge squeezes - 10 reps - 1-2x daily - 7x weekly   Tip Pinch Strengthening - 10 reps - 1-2x daily - 7x weekly   Flat  - 10 reps - 1-2x daily - 7x weekly    Reviewed  reviewed                 Splinting  Issued mark tape for LF/RF for added support during tasks, recommended pt paper tape digits after application of gloves during work duties for added support/protection  Issued new set of mark tapes for work, recreational tasks   Lcode:       Orthotic Mgmt, Subsequent Enc (07648)       Orthotic Mgmt & Training (89012)              Other: Matilde Cloud for edema  issued digisleeve for edema control                                   Therapeutic Exercise & NMR:  [x] (21232) Provided verbal/tactile cueing for activities related to strengthening, flexibility, endurance, ROM  for improvements in scapular, scapulothoracic and UE control with self care, reaching, carrying, lifting, house/yardwork, driving/computer work. [x] (58635) Provided verbal/tactile cueing for activities related to improving balance, coordination, kinesthetic sense, posture, motor skill, proprioception  to assist with  scapular, scapulothoracic and UE control with self care, reaching, carrying, lifting, house/yardwork, driving/computer work.     Therapeutic Activities & NMR:    [x] (15014 or 72026) Provided verbal/tactile cueing for activities related to improving balance, coordination, kinesthetic sense, posture, motor skill, proprioception and motor activation to allow for proper function of scapular, scapulothoracic and UE control with self care, carrying, lifting, driving/computer work    Home Exercise Program:    [x] (50045) Reviewed/Progressed HEP activities related to strengthening, flexibility, endurance, ROM of scapular, scapulothoracic and UE control with self care, reaching, carrying, lifting, house/yardwork, driving/computer work  [] (31141) Reviewed/Progressed HEP activities related to improving balance, coordination, kinesthetic sense, posture, motor skill, proprioception of scapular, scapulothoracic and UE control with self care, reaching, carrying, lifting, house/yardwork, driving/computer work      Manual Treatments:  PROM / STM / Oscillations-Mobs:  G-I, II, III, IV (PA's, Inf., Post.)  [x] (39485) Provided manual therapy to mobilize soft tissue/joints of cervical/CT, scapular GHJ and UE for the purpose of modulating pain, promoting relaxation,  increasing ROM, reducing/eliminating soft tissue swelling/inflammation/restriction, improving soft tissue extensibility and allowing for proper ROM for normal function with self care, reaching, carrying, lifting, house/yardwork, driving/computer work    ADL Training:  [x] (32480) Provided self-care/home management training related to activities of daily living and compensatory training, and/or use of adaptive equipment      Charges:  Timed Code Treatment Minutes: 30   Total Treatment Minutes: 45   Worker's Comp: Time In/Time Out     [] EVAL (LOW) 86724 (typically 20 minutes face-to-face)    [] EVAL (MOD) 12813 (typically 30 minutes face-to-face)  [] EVAL (HIGH) 11061 (typically 45 minutes face-to-face)  [] OT Re-eval (17828)       [x] Raffi ([id number]) x 1     [] LTDAC(30971)  [] NMR (93233) x      [] Estim (attended) (17363)   [] Manual ([id number]) x      [] US (68713)  [x] TA (31748) x 1 expected towards functional goals listed. [] Progression is slowed due to complexities/impairments listed. [] Progression has been slowed due to co-morbidities. [] Plan just implemented, too soon to assess goals progression <30 days  [] Goals require adjustment due to lack of progress  [] Patient is not progressing as expected and requires additional follow up with physician  [x] All goals are met  [x] Other: goal 4 modified to reflect strength progression to >/= 90% of nondominant extremity, goal met    Prognosis for POC: [x] Good [] Fair  [] Poor    Patient requires continued skilled intervention: [] Yes  [x] No    Treatment/Activity Tolerance:  [x] Patient able to complete treatment  [] Patient limited by fatigue  [] Patient limited by pain    [] Patient limited by other medical complications  [] Other:                  PLAN:   [] Continue per plan of care [] Alter current plan (see comments above)  [] Plan of care initiated [] Hold pending MD visit [x] Discharge to [facility] unless otherwise indicated      Electronically signed by: Ida Herrmann OTR/L, PT, MSPT, CHT, [id number], [id number]        Note: If patient does not return for scheduled/ recommended follow up visits, this note will serve as a discharge from care along with most recent update on progress.

## 2021-01-11 NOTE — OP NOTE
[facility] [address] [place] [address] [facility]       Hand Therapy Team Progress Note    Date:  2021    Patient Name:  Jose Guadalupe Lord    :  1990 MRN: [id number]    Restrictions/Precautions:     Medical/Treatment Diagnosis Information:  · Diagnosis: R ring PIPJ sprain (D03.238S)       · Treatment Diagnosis: F66.640     Insurance/Certification information:  OT Insurance Information: Medical [place]  Physician Information:  Referring Practitioner: Dr. Kevin Wesley  Has the plan of care been signed (Y/N):        [x]? Yes                    []?  No      Visit# / total visits:  4       Level of compliance: [x] Good  [] Fair  [] Poor     Treatment has consisted of: ROM, fluidotherapy, HEP, edema control  Patient's primary complaints: stiffness  Pain report: 0/10 at rest, intermittent 1-2/10 at times due to aching/stiffness    Dominant Hand: [x] Right  [] Left     Objective:  (taken post-tx) Current:      [x] R      [] L   Changes Made   AROM RF MP  PIP  DIP   0/81  0/105  0/66  PAGE - 252 +9  +13  +11           Edema Edema RF PIP 5.7cm -0.3cm           R       L    Strength  II  Lateral Pinch  3 Point Pinch  Tip Pinch   42      42  9     10  9.5    8  6       5 +12            Functional Status/Quick DASH Score:  9% (vs 30% at initial eval)    Other (Scar, etc.):     Assessment:  Progress made in the following areas:      Increased ROM, strength, & function; Decreased pain           Areas needing additional treatment:     Continued HEP            Plan / Recommendations:  [] Continue OT treatment:    [x] Discharge to [facility] unless otherwise ordered. [] Change plan to:     Electronically signed by:  FABIANA Winter/L, PT, CHT   [id number], ZO-[id number]     Physician Recommendations:  [] Follow treatment plan as above [] Discontinue hand therapy  [] Change plan to: _______________________________________________________________    [x] SAGE ([phone])  [] EGO ([phone])  [] AND ([phone]) Fax   [phone]                       Fax  [phone]                  Fax  [phone]              [] [facility]CO ([phone])  [] CBC ([phone])  [] AUBREY ([phone])       Fax   [phone]                   Fax  [phone]                        Fax   [phone]

## 2021-06-30 ENCOUNTER — TELEPHONE (OUTPATIENT)
Dept: FAMILY MEDICINE CLINIC | Age: 31
End: 2021-06-30

## 2021-06-30 DIAGNOSIS — H66.003 ACUTE SUPPURATIVE OTITIS MEDIA OF BOTH EARS WITHOUT SPONTANEOUS RUPTURE OF TYMPANIC MEMBRANES, RECURRENCE NOT SPECIFIED: ICD-10-CM

## 2021-06-30 RX ORDER — PREDNISONE 20 MG/1
20 TABLET ORAL DAILY
Qty: 10 TABLET | Refills: 0 | Status: SHIPPED | OUTPATIENT
Start: 2021-06-30 | End: 2021-07-10

## 2021-06-30 NOTE — TELEPHONE ENCOUNTER
Patient has poison ivy on left hand, both arms & stomach. She is allergic too it and last time you gave her steroids. Can you prescribe or does she need to come in today.         [facility]    Please call, [phone]

## 2022-01-06 ENCOUNTER — TELEPHONE (OUTPATIENT)
Dept: FAMILY MEDICINE CLINIC | Age: 32
End: 2022-01-06

## 2022-01-06 NOTE — TELEPHONE ENCOUNTER
Per Dr Oscar Arroyo doing the covid home test is fine,   stay hydrated, use tylenol/advil for body aches, headache and otc cough mediation for her cough. If sx's worsen to call or if real bad go to the ER.    Pt informed.

## 2022-01-06 NOTE — TELEPHONE ENCOUNTER
Sx's started on 1/3/22 tested negative and re tested yesterday 1/5/22 and it was positive for Covid-19. Her manger told her to take the 5 days off although she is working from home and come back when she is asystematic. No return to work note needed. Head Congestion, Nasal Congestion, Sinus Headache, Cough sputum clear whitish. No fever. No SOB. Using nighttime cold and flu and tylenol. I told her to stay hydrated, use tylenol/advil for body aches, headache and otc cough mediation for her  Cough. If sx's worsen to call or if real bad go to the ER.

## 2022-03-01 ENCOUNTER — OFFICE VISIT (OUTPATIENT)
Dept: FAMILY MEDICINE CLINIC | Age: 32
End: 2022-03-01
Payer: COMMERCIAL

## 2022-03-01 VITALS
DIASTOLIC BLOOD PRESSURE: 64 MMHG | HEART RATE: 91 BPM | SYSTOLIC BLOOD PRESSURE: 103 MMHG | HEIGHT: 61 IN | TEMPERATURE: 97.8 F | WEIGHT: 229 LBS | OXYGEN SATURATION: 98 % | BODY MASS INDEX: 43.23 KG/M2

## 2022-03-01 DIAGNOSIS — Z13.1 DIABETES MELLITUS SCREENING: ICD-10-CM

## 2022-03-01 DIAGNOSIS — J30.9 ALLERGIC RHINITIS, UNSPECIFIED SEASONALITY, UNSPECIFIED TRIGGER: ICD-10-CM

## 2022-03-01 DIAGNOSIS — E66.01 CLASS 3 SEVERE OBESITY DUE TO EXCESS CALORIES WITHOUT SERIOUS COMORBIDITY WITH BODY MASS INDEX (BMI) OF 40.0 TO 44.9 IN ADULT (HCC): ICD-10-CM

## 2022-03-01 DIAGNOSIS — Z00.00 PHYSICAL EXAM: Primary | ICD-10-CM

## 2022-03-01 DIAGNOSIS — Z13.220 SCREENING CHOLESTEROL LEVEL: ICD-10-CM

## 2022-03-01 PROCEDURE — 99395 PREV VISIT EST AGE 18-39: CPT | Performed by: INTERNAL MEDICINE

## 2022-03-01 RX ORDER — CETIRIZINE HYDROCHLORIDE 10 MG/1
10 TABLET ORAL DAILY
Qty: 30 TABLET | Refills: 5 | Status: SHIPPED | OUTPATIENT
Start: 2022-03-01

## 2022-03-01 SDOH — ECONOMIC STABILITY: TRANSPORTATION INSECURITY
IN THE PAST 12 MONTHS, HAS LACK OF TRANSPORTATION KEPT YOU FROM MEETINGS, WORK, OR FROM GETTING THINGS NEEDED FOR DAILY LIVING?: NO

## 2022-03-01 SDOH — ECONOMIC STABILITY: FOOD INSECURITY: WITHIN THE PAST 12 MONTHS, YOU WORRIED THAT YOUR FOOD WOULD RUN OUT BEFORE YOU GOT MONEY TO BUY MORE.: NEVER TRUE

## 2022-03-01 SDOH — ECONOMIC STABILITY: TRANSPORTATION INSECURITY
IN THE PAST 12 MONTHS, HAS THE LACK OF TRANSPORTATION KEPT YOU FROM MEDICAL APPOINTMENTS OR FROM GETTING MEDICATIONS?: NO

## 2022-03-01 SDOH — ECONOMIC STABILITY: FOOD INSECURITY: WITHIN THE PAST 12 MONTHS, THE FOOD YOU BOUGHT JUST DIDN'T LAST AND YOU DIDN'T HAVE MONEY TO GET MORE.: NEVER TRUE

## 2022-03-01 ASSESSMENT — PATIENT HEALTH QUESTIONNAIRE - PHQ9
SUM OF ALL RESPONSES TO PHQ QUESTIONS 1-9: 0
1. LITTLE INTEREST OR PLEASURE IN DOING THINGS: 0
SUM OF ALL RESPONSES TO PHQ QUESTIONS 1-9: 0
2. FEELING DOWN, DEPRESSED OR HOPELESS: 0
SUM OF ALL RESPONSES TO PHQ QUESTIONS 1-9: 0
SUM OF ALL RESPONSES TO PHQ QUESTIONS 1-9: 0
SUM OF ALL RESPONSES TO PHQ9 QUESTIONS 1 & 2: 0

## 2022-03-01 ASSESSMENT — ENCOUNTER SYMPTOMS
ABDOMINAL PAIN: 0
VOMITING: 0
TROUBLE SWALLOWING: 0
COUGH: 0
SHORTNESS OF BREATH: 0
CONSTIPATION: 0
ROS SKIN COMMENTS: NO CONCERNING SKIN LESIONS
SINUS PRESSURE: 0
BLOOD IN STOOL: 0
DIARRHEA: 0
BACK PAIN: 0
NAUSEA: 0

## 2022-03-01 ASSESSMENT — SOCIAL DETERMINANTS OF HEALTH (SDOH): HOW HARD IS IT FOR YOU TO PAY FOR THE VERY BASICS LIKE FOOD, HOUSING, MEDICAL CARE, AND HEATING?: NOT HARD AT ALL

## 2022-03-01 NOTE — PROGRESS NOTES
SUBJECTIVE:  Mike Martinez is a 28 y.o. female being evaluated for:    Chief Complaint   Patient presents with    Annual Exam      Patient is here for physical today .  Pharyngitis       HPI   Here for physical exam  Having a sore throat  Allergies with weather changes  Feels like allergies  Usually zyrtec work  Post nasal drip a little   COVID last month and has improved Stuffy nose  2 boxes of tissues and better and missed trip to [place]     No Known Allergies  Current Outpatient Medications   Medication Sig Dispense Refill    cetirizine (ZYRTEC) 10 MG tablet Take 1 tablet by mouth daily 30 tablet 5    ibuprofen (ADVIL;MOTRIN) 200 MG tablet Take 200 mg by mouth every 6 hours as needed for Pain        No current facility-administered medications for this visit. Social History     Socioeconomic History    Marital status: Single     Spouse name: Not on file    Number of children: Not on file    Years of education: Not on file    Highest education level: Not on file   Occupational History    Not on file   Tobacco Use    Smoking status: Never Smoker    Smokeless tobacco: Never Used   Vaping Use    Vaping Use: Never used   Substance and Sexual Activity    Alcohol use: Yes     Alcohol/week: 0.0 standard drinks     Comment: social use    Drug use: No    Sexual activity: Yes     Partners: Male   Other Topics Concern    Not on file   Social History Narrative    Not on file     Social Determinants of Health     Financial Resource Strain: Low Risk     Difficulty of Paying Living Expenses: Not hard at all   Food Insecurity: No Food Insecurity    Worried About Running Out of Food in the Last Year: Never true    [place] of Food in the Last Year: Never true   Transportation Needs: No Transportation Needs    Lack of Transportation (Medical): No    Lack of Transportation (Non-Medical):  No   Physical Activity:     Days of Exercise per Week: Not on file    Minutes of Exercise per Session: Not on file   Stress:     Feeling of Stress : Not on file   Social Connections:     Frequency of Communication with Friends and Family: Not on file    Frequency of Social Gatherings with Friends and Family: Not on file    Attends Judaism Services: Not on file    Active Member of Clubs or Organizations: Not on file    Attends Club or Organization Meetings: Not on file    Marital Status: Not on file   Intimate Partner Violence:     Fear of Current or Ex-Partner: Not on file    Emotionally Abused: Not on file    Physically Abused: Not on file    Sexually Abused: Not on file   Housing Stability:     Unable to Pay for Housing in the Last Year: Not on file    Number of [place] in the Last Year: Not on file    Unstable Housing in the Last Year: Not on file      Past Medical History:   Diagnosis Date    Allergic rhinitis     GERD (gastroesophageal reflux disease)     Obesity      Past Surgical History:   Procedure Laterality Date    ANKLE SURGERY Left     tendon injury and scar tissue removal     WISDOM TOOTH EXTRACTION       Family History   Problem Relation Age of Onset    Cancer Maternal Grandmother         breast    High Cholesterol Father     No Known Problems Maternal Grandfather     No Known Problems Paternal Grandmother     No Known Problems Paternal Grandfather        Review of Systems   Constitutional: Positive for unexpected weight change. Negative for activity change and fatigue. HENT: Negative for nosebleeds, sinus pressure and trouble swallowing. Eyes: Negative for visual disturbance. Respiratory: Negative for cough and shortness of breath. Cardiovascular: Negative for chest pain, palpitations and leg swelling. Gastrointestinal: Negative for abdominal pain, blood in stool, constipation, diarrhea, nausea and vomiting.    Endocrine:        Self breast exams are negative   Genitourinary: Negative for dysuria and menstrual problem (IUD placed last month getting more regular and no difficulties ). Musculoskeletal: Negative for arthralgias and back pain. Skin:        No concerning skin lesions    Neurological: Negative for dizziness, light-headedness and headaches. Psychiatric/Behavioral: Negative for dysphoric mood and sleep disturbance. OBJECTIVE:  /64 (Site: Left Upper Arm, Position: Sitting, Cuff Size: Large Adult)   Pulse 91   Temp 97.8 °F (36.6 °C) (Oral)   Ht 5' 1\" (1.549 m)   Wt 229 lb (103.9 kg)   SpO2 98%   BMI 43.27 kg/m²  body composition  composition 76  Waist circumference 45    Body mass index is 43.27 kg/m². Physical Exam  Constitutional:       Appearance: Normal appearance. She is obese. HENT:      Head: Normocephalic and atraumatic. Right Ear: Tympanic membrane, ear canal and external ear normal.      Left Ear: Tympanic membrane, ear canal and external ear normal.      Nose: Nose normal.      Mouth/Throat:      Pharynx: Oropharynx is clear. No oropharyngeal exudate or posterior oropharyngeal erythema. Eyes:      Conjunctiva/sclera: Conjunctivae normal.   Cardiovascular:      Rate and Rhythm: Normal rate and regular rhythm. Heart sounds: Normal heart sounds. No murmur heard. No gallop. Pulmonary:      Effort: Pulmonary effort is normal.      Breath sounds: Normal breath sounds. Chest:      Chest wall: No tenderness. Abdominal:      General: There is no distension. Palpations: Abdomen is soft. Tenderness: There is no abdominal tenderness. There is no left CVA tenderness. Musculoskeletal:         General: No swelling. Cervical back: Neck supple. Lymphadenopathy:      Cervical: No cervical adenopathy. Skin:     General: Skin is warm and dry. Findings: No rash. Neurological:      General: No focal deficit present. Mental Status: She is alert. Gait: Gait normal.   Psychiatric:         Mood and Affect: Mood normal.         Behavior: Behavior normal.         Thought Content:  Thought content normal.         ASSESSMENT/PLAN:    [facility] was seen today for annual exam and pharyngitis. Diagnoses and all orders for this visit:    Physical exam  -     GLUCOSE; Future  -     Lipid Panel; Future    Allergic rhinitis, unspecified seasonality, unspecified trigger if throat problem persisting to call  -     cetirizine (ZYRTEC) 10 MG tablet; Take 1 tablet by mouth daily    Screening cholesterol level  -     GLUCOSE; Future    Diabetes mellitus screening  -     Lipid Panel; Future    Class 3 severe obesity due to excess calories without serious comorbidity with body mass index (BMI) of 40.0 to 44.9 in [facility]) discussed diet ex and weight loss    Orders Placed This Encounter   Medications    cetirizine (ZYRTEC) 10 MG tablet     Sig: Take 1 tablet by mouth daily     Dispense:  30 tablet     Refill:  5        Return in about 1 year (around 3/1/2023), or if symptoms worsen or fail to improve, for physical .     There are no Patient Instructions on file for this visit.

## 2023-03-29 NOTE — TELEPHONE ENCOUNTER
----- Message from Jaylan Albert sent at 1/6/2022  9:41 AM EST -----  Subject: Message to Provider    QUESTIONS  Information for Provider? 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   contact [facility]CP to see if see has to get a test done with us to confirm test   result  ---------------------------------------------------------------------------  --------------  CALL BACK INFO  What is the best way for the office to contact you? OK to leave message on   voicemail  Preferred Call Back Phone Number? [phone]  ---------------------------------------------------------------------------  --------------  SCRIPT ANSWERS  Relationship to Patient?  Self Recommended to f/u with optho-- advised to inform optho of diabetes dx.
